# Patient Record
Sex: MALE | Race: WHITE | NOT HISPANIC OR LATINO | Employment: UNEMPLOYED | URBAN - METROPOLITAN AREA
[De-identification: names, ages, dates, MRNs, and addresses within clinical notes are randomized per-mention and may not be internally consistent; named-entity substitution may affect disease eponyms.]

---

## 2018-10-16 ENCOUNTER — TRANSCRIBE ORDERS (OUTPATIENT)
Dept: ADMINISTRATIVE | Facility: HOSPITAL | Age: 38
End: 2018-10-16

## 2018-10-16 DIAGNOSIS — I86.1 RIGHT VARICOCELE: ICD-10-CM

## 2018-10-16 DIAGNOSIS — N50.811 TESTICULAR PAIN, RIGHT: Primary | ICD-10-CM

## 2018-10-19 ENCOUNTER — HOSPITAL ENCOUNTER (OUTPATIENT)
Dept: CT IMAGING | Facility: HOSPITAL | Age: 38
Discharge: HOME | End: 2018-10-19
Admitting: NURSE PRACTITIONER

## 2018-10-19 DIAGNOSIS — I86.1 RIGHT VARICOCELE: ICD-10-CM

## 2018-10-19 DIAGNOSIS — N50.811 TESTICULAR PAIN, RIGHT: ICD-10-CM

## 2018-10-19 PROCEDURE — 74178 CT ABD&PLV WO CNTR FLWD CNTR: CPT

## 2018-10-19 PROCEDURE — 25010000002 IOPAMIDOL 61 % SOLUTION: Performed by: NURSE PRACTITIONER

## 2018-10-19 RX ADMIN — IOPAMIDOL 90 ML: 612 INJECTION, SOLUTION INTRAVENOUS at 09:50

## 2018-10-19 RX ADMIN — BARIUM SULFATE 450 ML: 21 SUSPENSION ORAL at 09:50

## 2024-03-26 ENCOUNTER — HOSPITAL ENCOUNTER (INPATIENT)
Facility: HOSPITAL | Age: 44
LOS: 1 days | Discharge: HOME | DRG: 309 | End: 2024-03-29
Attending: EMERGENCY MEDICINE | Admitting: INTERNAL MEDICINE
Payer: COMMERCIAL

## 2024-03-26 ENCOUNTER — APPOINTMENT (OUTPATIENT)
Dept: GENERAL RADIOLOGY | Facility: HOSPITAL | Age: 44
DRG: 309 | End: 2024-03-26
Payer: COMMERCIAL

## 2024-03-26 DIAGNOSIS — I48.91 ATRIAL FIBRILLATION WITH RAPID VENTRICULAR RESPONSE: Primary | ICD-10-CM

## 2024-03-26 LAB
ALBUMIN SERPL-MCNC: 4.7 G/DL (ref 3.5–5.2)
ALBUMIN/GLOB SERPL: 1.4 G/DL
ALP SERPL-CCNC: 79 U/L (ref 39–117)
ALT SERPL W P-5'-P-CCNC: 55 U/L (ref 1–41)
ANION GAP SERPL CALCULATED.3IONS-SCNC: 11 MMOL/L (ref 5–15)
AST SERPL-CCNC: 51 U/L (ref 1–40)
BASOPHILS # BLD AUTO: 0.08 10*3/MM3 (ref 0–0.2)
BASOPHILS NFR BLD AUTO: 0.7 % (ref 0–1.5)
BILIRUB SERPL-MCNC: 1.1 MG/DL (ref 0–1.2)
BUN SERPL-MCNC: 13 MG/DL (ref 6–20)
BUN/CREAT SERPL: 10.3 (ref 7–25)
CALCIUM SPEC-SCNC: 10.2 MG/DL (ref 8.6–10.5)
CHLORIDE SERPL-SCNC: 96 MMOL/L (ref 98–107)
CO2 SERPL-SCNC: 26 MMOL/L (ref 22–29)
CREAT SERPL-MCNC: 1.26 MG/DL (ref 0.76–1.27)
DEPRECATED RDW RBC AUTO: 42.5 FL (ref 37–54)
EGFRCR SERPLBLD CKD-EPI 2021: 72.1 ML/MIN/1.73
EOSINOPHIL # BLD AUTO: 0.08 10*3/MM3 (ref 0–0.4)
EOSINOPHIL NFR BLD AUTO: 0.7 % (ref 0.3–6.2)
ERYTHROCYTE [DISTWIDTH] IN BLOOD BY AUTOMATED COUNT: 12.9 % (ref 12.3–15.4)
GLOBULIN UR ELPH-MCNC: 3.3 GM/DL
GLUCOSE SERPL-MCNC: 108 MG/DL (ref 65–99)
HCT VFR BLD AUTO: 49.4 % (ref 37.5–51)
HGB BLD-MCNC: 16.8 G/DL (ref 13–17.7)
HOLD SPECIMEN: NORMAL
HOLD SPECIMEN: NORMAL
IMM GRANULOCYTES # BLD AUTO: 0.03 10*3/MM3 (ref 0–0.05)
IMM GRANULOCYTES NFR BLD AUTO: 0.3 % (ref 0–0.5)
LYMPHOCYTES # BLD AUTO: 1.91 10*3/MM3 (ref 0.7–3.1)
LYMPHOCYTES NFR BLD AUTO: 17.1 % (ref 19.6–45.3)
MAGNESIUM SERPL-MCNC: 2.2 MG/DL (ref 1.6–2.6)
MCH RBC QN AUTO: 30.3 PG (ref 26.6–33)
MCHC RBC AUTO-ENTMCNC: 34 G/DL (ref 31.5–35.7)
MCV RBC AUTO: 89 FL (ref 79–97)
MONOCYTES # BLD AUTO: 0.86 10*3/MM3 (ref 0.1–0.9)
MONOCYTES NFR BLD AUTO: 7.7 % (ref 5–12)
NEUTROPHILS NFR BLD AUTO: 73.5 % (ref 42.7–76)
NEUTROPHILS NFR BLD AUTO: 8.18 10*3/MM3 (ref 1.7–7)
NRBC BLD AUTO-RTO: 0 /100 WBC (ref 0–0.2)
PLATELET # BLD AUTO: 335 10*3/MM3 (ref 140–450)
PMV BLD AUTO: 10.1 FL (ref 6–12)
POTASSIUM SERPL-SCNC: 4.7 MMOL/L (ref 3.5–5.2)
PROT SERPL-MCNC: 8 G/DL (ref 6–8.5)
QT INTERVAL: 274 MS
QTC INTERVAL: 459 MS
RBC # BLD AUTO: 5.55 10*6/MM3 (ref 4.14–5.8)
SODIUM SERPL-SCNC: 133 MMOL/L (ref 136–145)
WBC NRBC COR # BLD AUTO: 11.14 10*3/MM3 (ref 3.4–10.8)
WHOLE BLOOD HOLD COAG: NORMAL
WHOLE BLOOD HOLD SPECIMEN: NORMAL

## 2024-03-26 PROCEDURE — 25810000003 SODIUM CHLORIDE 0.9 % SOLUTION: Performed by: EMERGENCY MEDICINE

## 2024-03-26 PROCEDURE — 93005 ELECTROCARDIOGRAM TRACING: CPT | Performed by: EMERGENCY MEDICINE

## 2024-03-26 PROCEDURE — 71045 X-RAY EXAM CHEST 1 VIEW: CPT

## 2024-03-26 PROCEDURE — 85025 COMPLETE CBC W/AUTO DIFF WBC: CPT | Performed by: EMERGENCY MEDICINE

## 2024-03-26 PROCEDURE — 99291 CRITICAL CARE FIRST HOUR: CPT

## 2024-03-26 PROCEDURE — 83735 ASSAY OF MAGNESIUM: CPT | Performed by: EMERGENCY MEDICINE

## 2024-03-26 PROCEDURE — 93005 ELECTROCARDIOGRAM TRACING: CPT

## 2024-03-26 PROCEDURE — 80053 COMPREHEN METABOLIC PANEL: CPT | Performed by: EMERGENCY MEDICINE

## 2024-03-26 RX ORDER — SODIUM CHLORIDE 0.9 % (FLUSH) 0.9 %
10 SYRINGE (ML) INJECTION AS NEEDED
Status: DISCONTINUED | OUTPATIENT
Start: 2024-03-26 | End: 2024-03-27

## 2024-03-26 RX ORDER — DILTIAZEM HYDROCHLORIDE 5 MG/ML
20 INJECTION INTRAVENOUS ONCE
Status: COMPLETED | OUTPATIENT
Start: 2024-03-26 | End: 2024-03-26

## 2024-03-26 RX ADMIN — DILTIAZEM HYDROCHLORIDE 20 MG: 5 INJECTION INTRAVENOUS at 23:00

## 2024-03-26 RX ADMIN — SODIUM CHLORIDE 1000 ML: 9 INJECTION, SOLUTION INTRAVENOUS at 23:00

## 2024-03-26 NOTE — Clinical Note
Level of Care: Telemetry [5]   Diagnosis: A-fib [[id number]]   Admitting Physician: RODO FIELDS [[id number]]   Attending Physician: RODO FIELDS [[id number]]

## 2024-03-27 ENCOUNTER — APPOINTMENT (OUTPATIENT)
Dept: CARDIOLOGY | Facility: HOSPITAL | Age: 44
DRG: 309 | End: 2024-03-27
Payer: COMMERCIAL

## 2024-03-27 PROBLEM — D72.829 LEUKOCYTOSIS: Status: ACTIVE | Noted: 2024-03-27

## 2024-03-27 PROBLEM — I48.91 ATRIAL FIBRILLATION WITH RVR: Status: ACTIVE | Noted: 2024-03-27

## 2024-03-27 PROBLEM — F41.8 SITUATIONAL ANXIETY: Status: ACTIVE | Noted: 2024-03-27

## 2024-03-27 PROBLEM — I10 HTN (HYPERTENSION): Status: ACTIVE | Noted: 2024-03-27

## 2024-03-27 PROBLEM — I48.0 PAF (PAROXYSMAL ATRIAL FIBRILLATION): Status: ACTIVE | Noted: 2024-03-27

## 2024-03-27 PROBLEM — I48.91 A-FIB: Status: ACTIVE | Noted: 2024-03-27

## 2024-03-27 LAB
ANION GAP SERPL CALCULATED.3IONS-SCNC: 9 MMOL/L (ref 5–15)
BASOPHILS # BLD AUTO: 0.06 10*3/MM3 (ref 0–0.2)
BASOPHILS NFR BLD AUTO: 0.5 % (ref 0–1.5)
BH CV ECHO MEAS - AO MAX PG: 6.5 MMHG
BH CV ECHO MEAS - AO MEAN PG: 3 MMHG
BH CV ECHO MEAS - AO ROOT DIAM: 3.6 CM
BH CV ECHO MEAS - AO V2 MAX: 127 CM/SEC
BH CV ECHO MEAS - AO V2 VTI: 27.1 CM
BH CV ECHO MEAS - AVA(I,D): 2.24 CM2
BH CV ECHO MEAS - EDV(CUBED): 79.5 ML
BH CV ECHO MEAS - EDV(MOD-SP2): 114 ML
BH CV ECHO MEAS - EDV(MOD-SP4): 81.9 ML
BH CV ECHO MEAS - EF(MOD-BP): 61.8 %
BH CV ECHO MEAS - EF(MOD-SP2): 66.1 %
BH CV ECHO MEAS - EF(MOD-SP4): 58.9 %
BH CV ECHO MEAS - ESV(CUBED): 22 ML
BH CV ECHO MEAS - ESV(MOD-SP2): 38.7 ML
BH CV ECHO MEAS - ESV(MOD-SP4): 33.7 ML
BH CV ECHO MEAS - FS: 34.9 %
BH CV ECHO MEAS - IVS/LVPW: 1.17 CM
BH CV ECHO MEAS - IVSD: 1.4 CM
BH CV ECHO MEAS - LA DIMENSION: 3.9 CM
BH CV ECHO MEAS - LAT PEAK E' VEL: 11.9 CM/SEC
BH CV ECHO MEAS - LV MASS(C)D: 207.8 GRAMS
BH CV ECHO MEAS - LV MAX PG: 4.2 MMHG
BH CV ECHO MEAS - LV MEAN PG: 2 MMHG
BH CV ECHO MEAS - LV V1 MAX: 103 CM/SEC
BH CV ECHO MEAS - LV V1 VTI: 19.3 CM
BH CV ECHO MEAS - LVIDD: 4.3 CM
BH CV ECHO MEAS - LVIDS: 2.8 CM
BH CV ECHO MEAS - LVOT AREA: 3.1 CM2
BH CV ECHO MEAS - LVOT DIAM: 2 CM
BH CV ECHO MEAS - LVPWD: 1.2 CM
BH CV ECHO MEAS - MED PEAK E' VEL: 15.7 CM/SEC
BH CV ECHO MEAS - MV DEC SLOPE: 527.5 CM/SEC2
BH CV ECHO MEAS - MV DEC TIME: 0.21 SEC
BH CV ECHO MEAS - MV E MAX VEL: 109.5 CM/SEC
BH CV ECHO MEAS - MV MAX PG: 6 MMHG
BH CV ECHO MEAS - MV MEAN PG: 2 MMHG
BH CV ECHO MEAS - MV V2 VTI: 17.5 CM
BH CV ECHO MEAS - MVA(VTI): 3.5 CM2
BH CV ECHO MEAS - PA ACC TIME: 0.12 SEC
BH CV ECHO MEAS - PA V2 MAX: 106.5 CM/SEC
BH CV ECHO MEAS - SV(LVOT): 60.6 ML
BH CV ECHO MEAS - SV(MOD-SP2): 75.3 ML
BH CV ECHO MEAS - SV(MOD-SP4): 48.2 ML
BH CV ECHO MEASUREMENTS AVERAGE E/E' RATIO: 7.93
BH CV VAS BP RIGHT ARM: NORMAL MMHG
BH CV XLRA - RV BASE: 3.2 CM
BH CV XLRA - RV LENGTH: 6.5 CM
BH CV XLRA - RV MID: 2.7 CM
BH CV XLRA - TDI S': 16.7 CM/SEC
BILIRUB UR QL STRIP: NEGATIVE
BUN SERPL-MCNC: 12 MG/DL (ref 6–20)
BUN/CREAT SERPL: 9.5 (ref 7–25)
CALCIUM SPEC-SCNC: 10 MG/DL (ref 8.6–10.5)
CHLORIDE SERPL-SCNC: 101 MMOL/L (ref 98–107)
CHOLEST SERPL-MCNC: 265 MG/DL (ref 0–200)
CLARITY UR: CLEAR
CO2 SERPL-SCNC: 26 MMOL/L (ref 22–29)
COLOR UR: YELLOW
CREAT SERPL-MCNC: 1.26 MG/DL (ref 0.76–1.27)
D-LACTATE SERPL-SCNC: 1.7 MMOL/L (ref 0.5–2)
D-LACTATE SERPL-SCNC: 2.1 MMOL/L (ref 0.5–2)
DEPRECATED RDW RBC AUTO: 43.7 FL (ref 37–54)
EGFRCR SERPLBLD CKD-EPI 2021: 72.1 ML/MIN/1.73
EOSINOPHIL # BLD AUTO: 0.1 10*3/MM3 (ref 0–0.4)
EOSINOPHIL NFR BLD AUTO: 0.9 % (ref 0.3–6.2)
ERYTHROCYTE [DISTWIDTH] IN BLOOD BY AUTOMATED COUNT: 13.2 % (ref 12.3–15.4)
GLUCOSE SERPL-MCNC: 115 MG/DL (ref 65–99)
GLUCOSE UR STRIP-MCNC: NEGATIVE MG/DL
HBA1C MFR BLD: 5.2 % (ref 4.8–5.6)
HCT VFR BLD AUTO: 47.5 % (ref 37.5–51)
HDLC SERPL-MCNC: 29 MG/DL (ref 40–60)
HGB BLD-MCNC: 16.2 G/DL (ref 13–17.7)
HGB UR QL STRIP.AUTO: NEGATIVE
HOLD SPECIMEN: NORMAL
IMM GRANULOCYTES # BLD AUTO: 0.03 10*3/MM3 (ref 0–0.05)
IMM GRANULOCYTES NFR BLD AUTO: 0.3 % (ref 0–0.5)
KETONES UR QL STRIP: NEGATIVE
LDLC SERPL CALC-MCNC: 178 MG/DL (ref 0–100)
LDLC/HDLC SERPL: 6.08 {RATIO}
LEFT ATRIUM VOLUME INDEX: 24.5 ML/M2
LEUKOCYTE ESTERASE UR QL STRIP.AUTO: NEGATIVE
LYMPHOCYTES # BLD AUTO: 1.78 10*3/MM3 (ref 0.7–3.1)
LYMPHOCYTES NFR BLD AUTO: 15.4 % (ref 19.6–45.3)
MCH RBC QN AUTO: 30.9 PG (ref 26.6–33)
MCHC RBC AUTO-ENTMCNC: 34.1 G/DL (ref 31.5–35.7)
MCV RBC AUTO: 90.6 FL (ref 79–97)
MONOCYTES # BLD AUTO: 0.8 10*3/MM3 (ref 0.1–0.9)
MONOCYTES NFR BLD AUTO: 6.9 % (ref 5–12)
NEUTROPHILS NFR BLD AUTO: 76 % (ref 42.7–76)
NEUTROPHILS NFR BLD AUTO: 8.81 10*3/MM3 (ref 1.7–7)
NITRITE UR QL STRIP: NEGATIVE
NRBC BLD AUTO-RTO: 0 /100 WBC (ref 0–0.2)
NT-PROBNP SERPL-MCNC: 589.5 PG/ML (ref 0–450)
PH UR STRIP.AUTO: 6.5 [PH] (ref 5–8)
PLATELET # BLD AUTO: 318 10*3/MM3 (ref 140–450)
PMV BLD AUTO: 10 FL (ref 6–12)
POTASSIUM SERPL-SCNC: 4.6 MMOL/L (ref 3.5–5.2)
PROCALCITONIN SERPL-MCNC: 0.04 NG/ML (ref 0–0.25)
PROT UR QL STRIP: NEGATIVE
RBC # BLD AUTO: 5.24 10*6/MM3 (ref 4.14–5.8)
SODIUM SERPL-SCNC: 136 MMOL/L (ref 136–145)
SP GR UR STRIP: 1.01 (ref 1–1.03)
TRIGL SERPL-MCNC: 298 MG/DL (ref 0–150)
TROPONIN T SERPL HS-MCNC: 36 NG/L
TSH SERPL DL<=0.05 MIU/L-ACNC: 1.95 UIU/ML (ref 0.27–4.2)
UROBILINOGEN UR QL STRIP: NORMAL
VLDLC SERPL-MCNC: 58 MG/DL (ref 5–40)
WBC NRBC COR # BLD AUTO: 11.58 10*3/MM3 (ref 3.4–10.8)

## 2024-03-27 PROCEDURE — 81003 URINALYSIS AUTO W/O SCOPE: CPT | Performed by: NURSE PRACTITIONER

## 2024-03-27 PROCEDURE — 25810000003 SODIUM CHLORIDE 0.9 % SOLUTION: Performed by: NURSE PRACTITIONER

## 2024-03-27 PROCEDURE — G0378 HOSPITAL OBSERVATION PER HR: HCPCS

## 2024-03-27 PROCEDURE — 83605 ASSAY OF LACTIC ACID: CPT | Performed by: NURSE PRACTITIONER

## 2024-03-27 PROCEDURE — 36415 COLL VENOUS BLD VENIPUNCTURE: CPT

## 2024-03-27 PROCEDURE — 80061 LIPID PANEL: CPT | Performed by: NURSE PRACTITIONER

## 2024-03-27 PROCEDURE — 84443 ASSAY THYROID STIM HORMONE: CPT | Performed by: EMERGENCY MEDICINE

## 2024-03-27 PROCEDURE — 84484 ASSAY OF TROPONIN QUANT: CPT | Performed by: EMERGENCY MEDICINE

## 2024-03-27 PROCEDURE — 83880 ASSAY OF NATRIURETIC PEPTIDE: CPT | Performed by: EMERGENCY MEDICINE

## 2024-03-27 PROCEDURE — 93005 ELECTROCARDIOGRAM TRACING: CPT | Performed by: PHYSICIAN ASSISTANT

## 2024-03-27 PROCEDURE — 83036 HEMOGLOBIN GLYCOSYLATED A1C: CPT | Performed by: NURSE PRACTITIONER

## 2024-03-27 PROCEDURE — 80048 BASIC METABOLIC PNL TOTAL CA: CPT | Performed by: NURSE PRACTITIONER

## 2024-03-27 PROCEDURE — 25010000002 AMIODARONE IN DEXTROSE 5% 360-4.14 MG/200ML-% SOLUTION: Performed by: PHYSICIAN ASSISTANT

## 2024-03-27 PROCEDURE — 25810000003 SODIUM CHLORIDE 0.9 % SOLUTION: Performed by: INTERNAL MEDICINE

## 2024-03-27 PROCEDURE — 84145 PROCALCITONIN (PCT): CPT | Performed by: NURSE PRACTITIONER

## 2024-03-27 PROCEDURE — 25010000002 AMIODARONE IN DEXTROSE 5% 150-4.21 MG/100ML-% SOLUTION: Performed by: PHYSICIAN ASSISTANT

## 2024-03-27 PROCEDURE — 25010000002 ENOXAPARIN PER 10 MG: Performed by: NURSE PRACTITIONER

## 2024-03-27 PROCEDURE — 99232 SBSQ HOSP IP/OBS MODERATE 35: CPT | Performed by: INTERNAL MEDICINE

## 2024-03-27 PROCEDURE — 99223 1ST HOSP IP/OBS HIGH 75: CPT | Performed by: INTERNAL MEDICINE

## 2024-03-27 PROCEDURE — 85025 COMPLETE CBC W/AUTO DIFF WBC: CPT | Performed by: NURSE PRACTITIONER

## 2024-03-27 PROCEDURE — 93306 TTE W/DOPPLER COMPLETE: CPT

## 2024-03-27 RX ORDER — ACETAMINOPHEN 160 MG/5ML
650 SOLUTION ORAL EVERY 4 HOURS PRN
Status: DISCONTINUED | OUTPATIENT
Start: 2024-03-27 | End: 2024-03-29 | Stop reason: HOSPADM

## 2024-03-27 RX ORDER — SODIUM CHLORIDE 9 MG/ML
75 INJECTION, SOLUTION INTRAVENOUS CONTINUOUS
Status: ACTIVE | OUTPATIENT
Start: 2024-03-27 | End: 2024-03-27

## 2024-03-27 RX ORDER — ZOLPIDEM TARTRATE 5 MG/1
5 TABLET ORAL NIGHTLY PRN
Status: DISCONTINUED | OUTPATIENT
Start: 2024-03-27 | End: 2024-03-29 | Stop reason: HOSPADM

## 2024-03-27 RX ORDER — AMOXICILLIN 250 MG
2 CAPSULE ORAL 2 TIMES DAILY PRN
Status: DISCONTINUED | OUTPATIENT
Start: 2024-03-27 | End: 2024-03-29 | Stop reason: HOSPADM

## 2024-03-27 RX ORDER — ACETAMINOPHEN 650 MG/1
650 SUPPOSITORY RECTAL EVERY 4 HOURS PRN
Status: DISCONTINUED | OUTPATIENT
Start: 2024-03-27 | End: 2024-03-29 | Stop reason: HOSPADM

## 2024-03-27 RX ORDER — CETIRIZINE HYDROCHLORIDE 10 MG/1
10 TABLET ORAL DAILY
COMMUNITY

## 2024-03-27 RX ORDER — SODIUM CHLORIDE 9 MG/ML
40 INJECTION, SOLUTION INTRAVENOUS AS NEEDED
Status: DISCONTINUED | OUTPATIENT
Start: 2024-03-27 | End: 2024-03-29 | Stop reason: HOSPADM

## 2024-03-27 RX ORDER — SODIUM CHLORIDE 0.9 % (FLUSH) 0.9 %
10 SYRINGE (ML) INJECTION AS NEEDED
Status: DISCONTINUED | OUTPATIENT
Start: 2024-03-27 | End: 2024-03-29 | Stop reason: HOSPADM

## 2024-03-27 RX ORDER — ONDANSETRON 4 MG/1
4 TABLET, ORALLY DISINTEGRATING ORAL EVERY 6 HOURS PRN
Status: DISCONTINUED | OUTPATIENT
Start: 2024-03-27 | End: 2024-03-29 | Stop reason: HOSPADM

## 2024-03-27 RX ORDER — SODIUM CHLORIDE 0.9 % (FLUSH) 0.9 %
10 SYRINGE (ML) INJECTION EVERY 12 HOURS SCHEDULED
Status: DISCONTINUED | OUTPATIENT
Start: 2024-03-27 | End: 2024-03-29 | Stop reason: HOSPADM

## 2024-03-27 RX ORDER — CHOLECALCIFEROL (VITAMIN D3) 125 MCG
5 CAPSULE ORAL NIGHTLY PRN
Status: DISCONTINUED | OUTPATIENT
Start: 2024-03-27 | End: 2024-03-29 | Stop reason: HOSPADM

## 2024-03-27 RX ORDER — HYDROXYZINE HYDROCHLORIDE 25 MG/1
25 TABLET, FILM COATED ORAL 3 TIMES DAILY PRN
Status: DISCONTINUED | OUTPATIENT
Start: 2024-03-27 | End: 2024-03-29 | Stop reason: HOSPADM

## 2024-03-27 RX ORDER — ENOXAPARIN SODIUM 100 MG/ML
40 INJECTION SUBCUTANEOUS EVERY 12 HOURS SCHEDULED
Status: DISCONTINUED | OUTPATIENT
Start: 2024-03-27 | End: 2024-03-27

## 2024-03-27 RX ORDER — ACETAMINOPHEN 325 MG/1
650 TABLET ORAL EVERY 4 HOURS PRN
Status: DISCONTINUED | OUTPATIENT
Start: 2024-03-27 | End: 2024-03-29 | Stop reason: HOSPADM

## 2024-03-27 RX ORDER — POLYETHYLENE GLYCOL 3350 17 G/17G
17 POWDER, FOR SOLUTION ORAL DAILY PRN
Status: DISCONTINUED | OUTPATIENT
Start: 2024-03-27 | End: 2024-03-29 | Stop reason: HOSPADM

## 2024-03-27 RX ORDER — ONDANSETRON 2 MG/ML
4 INJECTION INTRAMUSCULAR; INTRAVENOUS EVERY 6 HOURS PRN
Status: DISCONTINUED | OUTPATIENT
Start: 2024-03-27 | End: 2024-03-29 | Stop reason: HOSPADM

## 2024-03-27 RX ORDER — BISACODYL 10 MG
10 SUPPOSITORY, RECTAL RECTAL DAILY PRN
Status: DISCONTINUED | OUTPATIENT
Start: 2024-03-27 | End: 2024-03-29 | Stop reason: HOSPADM

## 2024-03-27 RX ORDER — DILTIAZEM HCL/D5W 125 MG/125
5-15 PLASTIC BAG, INJECTION (ML) INTRAVENOUS
Status: DISCONTINUED | OUTPATIENT
Start: 2024-03-27 | End: 2024-03-27

## 2024-03-27 RX ORDER — BISACODYL 5 MG/1
5 TABLET, DELAYED RELEASE ORAL DAILY PRN
Status: DISCONTINUED | OUTPATIENT
Start: 2024-03-27 | End: 2024-03-29 | Stop reason: HOSPADM

## 2024-03-27 RX ORDER — LOSARTAN POTASSIUM 50 MG/1
75 TABLET ORAL DAILY
COMMUNITY
End: 2024-03-29 | Stop reason: HOSPADM

## 2024-03-27 RX ADMIN — SODIUM CHLORIDE 75 ML/HR: 9 INJECTION, SOLUTION INTRAVENOUS at 07:01

## 2024-03-27 RX ADMIN — METOPROLOL TARTRATE 25 MG: 25 TABLET, FILM COATED ORAL at 20:15

## 2024-03-27 RX ADMIN — Medication 5 MG/HR: at 00:55

## 2024-03-27 RX ADMIN — AMIODARONE HYDROCHLORIDE 150 MG: 1.5 INJECTION, SOLUTION INTRAVENOUS at 10:09

## 2024-03-27 RX ADMIN — ENOXAPARIN SODIUM 40 MG: 100 INJECTION SUBCUTANEOUS at 08:06

## 2024-03-27 RX ADMIN — RIVAROXABAN 20 MG: 20 TABLET, FILM COATED ORAL at 18:27

## 2024-03-27 RX ADMIN — Medication 15 MG/HR: at 08:17

## 2024-03-27 RX ADMIN — AMIODARONE HYDROCHLORIDE 1 MG/MIN: 1.8 INJECTION, SOLUTION INTRAVENOUS at 16:19

## 2024-03-27 RX ADMIN — METOPROLOL TARTRATE 25 MG: 25 TABLET, FILM COATED ORAL at 09:03

## 2024-03-27 RX ADMIN — AMIODARONE HYDROCHLORIDE 1 MG/MIN: 1.8 INJECTION, SOLUTION INTRAVENOUS at 21:36

## 2024-03-27 RX ADMIN — SODIUM CHLORIDE 1000 ML: 9 INJECTION, SOLUTION INTRAVENOUS at 03:20

## 2024-03-27 RX ADMIN — AMIODARONE HYDROCHLORIDE 1 MG/MIN: 1.8 INJECTION, SOLUTION INTRAVENOUS at 10:09

## 2024-03-27 NOTE — CONSULTS
Cardiology Consult - [facility]    Neo Stern     S213/1  1980  [address] [place] [address] [place]  [facility] [id number]         Admission Date:  3/26/2024    Consultation Date:  03/27/24        PCP:  Lea Archer APRN  Referring MD:  Dr. Godfrey  Consulting MD:  Dr. Oneill  Primary Cardiologist:  Dr. Bob        CC: Lightheadedness, tachy palps    Reason for Consult: AFib with RVR        Allergies:  is allergic to doxycycline and erythromycin.    Medications Prior to Admission   Medication Sig Dispense Refill Last Dose    cetirizine (zyrTEC) 10 MG tablet Take 1 tablet by mouth Daily.   3/26/2024    losartan (COZAAR) 50 MG tablet Take 1.5 tablets by mouth Daily.          dilTIAZem, 5-15 mg/hr, Last Rate: 15 mg/hr (03/27/24 0154)  sodium chloride, 75 mL/hr, Last Rate: 75 mL/hr (03/27/24 0701)          HPI:  Neo Stern is a 44 yo male with PMHx HTN, PAF, seasonal allergies, anxiety.  Recently underwent sleep study and was diagnosed with sleep apnea - he has a follow up with them.  He was diagnosed with AFib in 2008 and at one time put on a beta blocker and coumadin.  He has since discontinued those meds.  Had another episode AFib in 2018, was placed on oral Cardizem short term and saw Dr. Bob in the office for further evaluation but did not keep follow up.    He presents to [facility] ED after having a pre syncopal episode and fluttering after receiving 2 shots of lidocaine for a skin tag removal.  Heart rates did not normalize after several hours prompting him to seek medical attention.  Upon arrival to ED, found to be in AFib with HR 160s. He was started on Cardizem gtt, admitted to hospitalist services and cardiology has been asked to follow in consultation.    At time of consultation, he remains in AFib rates 120-160s.  His wife is presents at bedside.  This is the first episode of AFib since 2018.  He does feel fluttering on occasion but these are very fleeting.  Again, he reiterates  "that he just underwent sleep study and has follow up to be fitted for CPAP.  He denies excessive caffeine intake, denies illicit drug use.  He is very active at work and works long hours.  He admits to being very stressed and anxious and when his heart races/skips it makes him more anxious.  He has no family history of precocious CAD or arrhythmia.          Social History     Socioeconomic History    Marital status:    Tobacco Use    Smoking status: Never    Smokeless tobacco: Never   Substance and Sexual Activity    Alcohol use: No    Drug use: No    Sexual activity: Defer     Family History   Problem Relation Age of Onset    No Known Problems Mother     Cancer Father     Hypertension Father      Past Surgical History:   Procedure Laterality Date    NO PAST SURGERIES       ROS: Pertinent items are noted in HPI, all other systems reviewed and negative     Objective     height is 185.4 cm (73\") and weight is 143 kg (315 lb) (abnormal). His oral temperature is 97.8 °F (36.6 °C). His blood pressure is 156/94 and his pulse is 99. His respiration is 20 and oxygen saturation is 96%.    Intake/Output Summary (Last 24 hours) at 3/27/2024 0806  Last data filed at 3/27/2024 0600  Gross per 24 hour   Intake 1000 ml   Output 525 ml   Net 475 ml     Intake/Output                   03/26/24 0701 - 03/27/24 0700     [id number] Total              Intake    IV Piggyback  --  1000 1000    Total Intake -- 1000 1000       Output    Urine  --  525 525    Total Output -- 525 525               03/26/24 2227   Weight: (!) 143 kg (315 lb)          Physical Exam:  General Appearance:    Alert, cooperative, in no acute distress   Head:    Normocephalic, without obvious abnormality, atraumatic   Eyes:            Lids and lashes normal, conjunctivae and sclerae normal, no   icterus, no pallor, corneas clear, PERRLA   Ears:    Ears appear intact with no abnormalities noted   Throat:   No oral lesions, no thrush, oral mucosa " moist   Neck:   No adenopathy, supple, trachea midline, no thyromegaly, no carotid bruit, no JVD   Back:     No kyphosis present, no scoliosis present, no skin lesions,    erythema or scars, no tenderness to percussion or                   palpation, range of motion normal   Lungs:     Clear to auscultation,respirations regular, even and               unlabored    Heart:    Irregular rhythm and tachycardic rate, normal S1 and S2, no         murmur, no gallop, no rub, no click   Abdomen:     Normal bowel sounds, no masses, no organomegaly, soft     nontender, nondistended, no guarding, no rebound   tenderness   Extremities:   Moves all extremities well,  no cyanosis, no redness, no edema   Pulses:   Pulses palpable and equal bilaterally   Skin:   No bleeding, bruising or rash   Lymph nodes:   No palpable adenopathy   Neurologic:   Cranial nerves 2 - 12 grossly intact, sensation intact, DTR     present and equal bilaterally          Results Review:  I personally reviewed the patient's clinical results.  Results from last 7 days   Lab Units 03/26/24  2239   WBC 10*3/mm3 11.14*   HEMOGLOBIN g/dL 16.8   HEMATOCRIT % 49.4   PLATELETS 10*3/mm3 335     Results from last 7 days   Lab Units 03/26/24  2239   SODIUM mmol/L 133*   POTASSIUM mmol/L 4.7   CHLORIDE mmol/L 96*   CO2 mmol/L 26.0   BUN mg/dL 13   CREATININE mg/dL 1.26   CALCIUM mg/dL 10.2   BILIRUBIN mg/dL 1.1   ALK PHOS U/L 79   ALT (SGPT) U/L 55*   AST (SGOT) U/L 51*   GLUCOSE mg/dL 108*           Results from last 7 days   Lab Units 03/27/24  0254   TSH uIU/mL 1.950         Results from last 7 days   Lab Units 03/27/24  0254   PROBNP pg/mL 589.5*             Radiology:  Imaging Results (Last 72 Hours)       Procedure Component Value Units Date/Time    XR Chest 1 View [[id number]] Collected: 03/26/24 2256     Updated: 03/26/24 2300    Narrative:      XR CHEST 1 VW    Date of Exam: 3/26/2024 10:41 PM EDT    Indication: Dysrhythmia triage protocol    Comparison:  2/25/2017    Findings:  Lines: None    Lungs: The lungs are clear.  Pleura: No pleural effusion or pneumothorax.    Cardiomediastinum: The cardiomediastinal silhouette is normal    Soft Tissues: Unremarkable.    Bones: No acute osseous abnormality.      Impression:      Impression:  No acute abnormality      Electronically Signed: Brendon Ramon DO    3/26/2024 10:57 PM EDT    Workstation ID: [id number]              Tele:  Atrial Fibrillation    ASSESSMENT:  -  42 yo male with PMHx PAF presents to [facility] ED with pre syncope and tachy palpitations after receiving 2 shots of Lidocaine for outpt procedure. Found to be in AFib with RVR upon presentation  -HTN  -HLP  -Obesity  -CRISTINO, new diagnosis.  Undergoing work up for CPAP.          PLAN:  -Admitted to hospitalist services.  Started on IV Cardizem per protocol.  -  Echo ordered, will review.  Allow patient to eat.  -  DC IV Cardizem, initiate amiodarone with bolus.  Continue beta blocker for now.  Consider Toprol XL at time of DC   -  DC Lovenox, start Xarelto 20mg daily with dinner.  CHADS2 Vasc = 1 (HTN).  -  Keep follow up with Pulm/Sleep Study to get evaluated for CPAP  -  Cardiology will continue to follow.  Hopefully home in a.m.            I discussed the patient's findings and my recommendations with the patient, any present family members, and the nursing staff.  Montrell Oneill MD saw and examined patient, verified hx and PE, read all radiographic studies, reviewed labs and micro data, and formulated dx, plan for treatment and all medical decision making.      Susan Meza PA-C, working with Montrell Oneill MD  03/27/24 08:06 EDT

## 2024-03-27 NOTE — PLAN OF CARE
Goal Outcome Evaluation:  Pt arrived to the floor at 0500.  Pt A&Ox4, Afib with uncontrolled rated on cardiziemat 15mls/hr.  RA. Q15min b/ps, on tele, pt denies any chest pain or pressure.  No s/s of distress.  Bed locked and low, SR up x2, call light in reach and alarm on.

## 2024-03-27 NOTE — ED NOTES
Neo Stern    Nursing Report ED to Floor:  Mental status: A&Ox4  Ambulatory status: Stand  by  Oxygen Therapy:  RA\  Cardiac Rhythm: A-fib w/ RVR  Admitted from: Home  Safety Concerns:  n/a  Social Issues: n/a  ED Room #:  11    ED Nurse Phone Extension - [phone] or may call [phone].      HPI:   Chief Complaint   Patient presents with    Atrial Fibrillation       Past Medical History:  Past Medical History:   Diagnosis Date    A-fib         Past Surgical History:  No past surgical history on file.     Admitting Doctor:   Jarvis Godfrey III, DO    Consulting Provider(s):  Consults       No orders found for last 30 day(s).             Admitting Diagnosis:   The encounter diagnosis was Atrial fibrillation with rapid ventricular response.    Most Recent Vitals:   Vitals:    03/27/24 0308 03/27/24 0309 03/27/24 0310 03/27/24 0311   BP:       Pulse: 107 108 101 116   Resp:       Temp:       TempSrc:       SpO2:  94% 94% 94%   Weight:       Height:           Active LDAs/IV Access:   Lines, Drains & Airways       Active LDAs       Name Placement date Placement time Site Days    Peripheral IV 03/26/24 2240 Left Antecubital 03/26/24 2240  Antecubital  less than 1                    Labs (abnormal labs have a star):   Labs Reviewed   COMPREHENSIVE METABOLIC PANEL - Abnormal; Notable for the following components:       Result Value    Glucose 108 (*)     Sodium 133 (*)     Chloride 96 (*)     ALT (SGPT) 55 (*)     AST (SGOT) 51 (*)     All other components within normal limits    Narrative:     GFR Normal >60  Chronic Kidney Disease <60  Kidney Failure <15     CBC WITH AUTO DIFFERENTIAL - Abnormal; Notable for the following components:    WBC 11.14 (*)     Lymphocyte % 17.1 (*)     Neutrophils, Absolute 8.18 (*)     All other components within normal limits   MAGNESIUM - Normal   RAINBOW DRAW    Narrative:     The following orders were created for panel order [place] Draw.  Procedure                                Abnormality         Status                     ---------                               -----------         ------                     Green Top (Gel)[[id number]]                                  Final result               Lavender Top[[id number]]                                     Final result               Gold Top - SST[[id number]]                                   Final result               Gray Top[[id number]]                                         Final result               Light Blue Top[[id number]]                                   Final result                 Please view results for these tests on the individual orders.   SINGLE HS TROPONIN T   TSH   BNP (IN-HOUSE)   CBC AND DIFFERENTIAL    Narrative:     The following orders were created for panel order CBC & Differential.  Procedure                               Abnormality         Status                     ---------                               -----------         ------                     CBC Auto Differential[[id number]]        Abnormal            Final result                 Please view results for these tests on the individual orders.   GREEN TOP   LAVENDER TOP   GOLD TOP - SST   GRAY TOP   LIGHT BLUE TOP       Meds Given in ED:   Medications   sodium chloride 0.9 % flush 10 mL (has no administration in time range)   dilTIAZem (CARDIZEM) 125 mg in 125 mL D5W infusion (15 mg/hr Intravenous Rate/Dose Change 3/27/24 0154)   sodium chloride 0.9 % bolus 1,000 mL (has no administration in time range)   dilTIAZem (CARDIZEM) injection 20 mg (20 mg Intravenous Given 3/26/24 2300)   sodium chloride 0.9 % bolus 1,000 mL (0 mL Intravenous Stopped 3/27/24 0027)     dilTIAZem, 5-15 mg/hr, Last Rate: 15 mg/hr (03/27/24 0154)         Last NIH score:                                                          Dysphagia screening results:  Patient Factors Component (Dysphagia:Stroke or Rule-out)  Best Eye Response: 4-->(E4) spontaneous (03/26/24 2305)  Best Motor Response:  6-->(M6) obeys commands (03/26/24 2305)  Best Verbal Response: 5-->(V5) oriented (03/26/24 2305)  [place] Coma Scale Score: 15 (03/26/24 2305)     [place] Coma Scale:  No data recorded     CIWA:        Restraint Type:            Isolation Status:  No active isolations

## 2024-03-27 NOTE — H&P
"    [facility] Medicine Services  HISTORY AND PHYSICAL    Patient Name: Neo Stern  : 1980  MRN: [id number]  Primary Care Physician: Lea Archer APRN  Date of admission: 3/26/2024    Subjective   Subjective     Chief Complaint:  Palpitations     HPI:  Neo Stern is a 43 y.o. male w/ a hx of HTN, HLD, paroxysmal atrial fibrillation who presented to the ED w/ c/o palpitations.   Pt first diagnosed w/ atrial fibrillation at 27 yrs ago. Pt reports that he was seen at an ED and given medication and converted to NSR. At that time he was started on a beta blocker and coumadin. He was referred to EP Cardiology (no intervention done at that time). Pt stopped the BB and coumadin some time after the episode. Pt did not experience another episode of A.Fib until 2018. He describes that he had been sick and developed palpitations and was evaluated at [facility] and found to be in A.Fib. Pt was given Cardizem. Pt was then referred to Dr. Bob w/ Cardiology. Pt reports that he was not started on medication and has not had a f/u in some time. Since 2018, pt reports that he will very randomly and infrequently have brief episodes of palpitations that resolve quickly w/ no associated symptoms.   Tuesday afternoon pt was seen at Dermatology for a skin tag removal. He describes that after he was given two shots of lidocaine he stood up and felt suddenly faint. He did not have LOC but felt as though he may pass out. Simultaneously he developed palpitations and \"fluttering\". Pt went home and continued to have a \"fluttering\" sensation. He checked his pulse and noted it to be ~109bpm prompting his ED visit.   Pt evaluated in the ED. EKG c/w A.Fib RVR (rate 160's). Troponin 36, proBNP 589. Pt admitted to the hospital medicine service for further evaluation.     Review of Systems   Constitutional: Negative.  Negative for chills, diaphoresis, fatigue, fever and unexpected weight change. "   HENT: Negative.  Negative for congestion, postnasal drip, rhinorrhea, sinus pressure, sinus pain, sneezing, sore throat and trouble swallowing.    Eyes: Negative.  Negative for visual disturbance.   Respiratory: Negative.  Negative for cough, shortness of breath and wheezing.    Cardiovascular:  Positive for palpitations. Negative for chest pain and leg swelling.   Gastrointestinal: Negative.  Negative for abdominal distention, abdominal pain, blood in stool, constipation, diarrhea, nausea and vomiting.   Endocrine: Negative.    Genitourinary: Negative.  Negative for decreased urine volume, difficulty urinating, dysuria, frequency and urgency.   Musculoskeletal: Negative.  Negative for arthralgias, back pain, myalgias, neck pain and neck stiffness.   Skin: Negative.  Negative for wound.   Allergic/Immunologic: Negative.  Negative for immunocompromised state.   Neurological: Negative.  Negative for dizziness, tremors, seizures, syncope, facial asymmetry, speech difficulty, weakness, light-headedness, numbness and headaches.        Near-syncope.    Hematological: Negative.  Does not bruise/bleed easily.   Psychiatric/Behavioral:  Negative for confusion. The patient is nervous/anxious.    All other systems reviewed and are negative.     Personal History     Past Medical History:   Diagnosis Date    A-fib     HLD (hyperlipidemia)     Hypertension      Past Surgical History:   Procedure Laterality Date    NO PAST SURGERIES       Family History: family history includes Cancer in his father; Hypertension in his father; No Known Problems in his mother.     Social History:  reports that he has never smoked. He has never used smokeless tobacco. He reports that he does not drink alcohol and does not use drugs.  Social History     Social History Narrative    Not on file     Medications:  amoxicillin    Allergies   Allergen Reactions    Doxycycline Other (See Comments)     Don't work on him     Erythromycin Other (See  Comments)     Happened as a child can not remember reaction  Stomach upset     Objective   Objective     Vital Signs:   Temp:  [97.9 °F (36.6 °C)] 97.9 °F (36.6 °C)  Heart Rate:  [] 130  Resp:  [18] 18  BP: ()/() 120/72    Physical Exam     Constitutional: Awake, alert; non-toxic appearing   Eyes: PERRLA, sclerae anicteric, no conjunctival injection  HENT: NCAT, mucous membranes moist  Neck: Supple, no thyromegaly, no lymphadenopathy, trachea midline  Respiratory: Clear to auscultation bilaterally, nonlabored respirations   Cardiovascular: Irregular rate and rhythm; no murmurs, rubs, or gallops, no peripheral edema   Gastrointestinal: Positive bowel sounds, soft, nontender, nondistended  Musculoskeletal: Normal ROM bilaterally   Psychiatric: Appropriate affect, cooperative  Neurologic: Oriented x 3, strength symmetric in all extremities, Cranial Nerves grossly intact to confrontation, speech clear  Skin: No rashes, lesions or wounds     Result Review:  I have personally reviewed the results from the time of this admission to 3/27/2024 03:59 EDT and agree with these findings:  [x]  Laboratory list / accordion  []  Microbiology  [x]  Radiology  [x]  EKG/Telemetry   []  Cardiology/Vascular   []  Pathology  [x]  Old records    LAB RESULTS:      Lab 03/26/24 2239   WBC 11.14*   HEMOGLOBIN 16.8   HEMATOCRIT 49.4   PLATELETS 335   NEUTROS ABS 8.18*   IMMATURE GRANS (ABS) 0.03   LYMPHS ABS 1.91   MONOS ABS 0.86   EOS ABS 0.08   MCV 89.0         Lab 03/27/24 0254 03/26/24 2239   SODIUM  --  133*   POTASSIUM  --  4.7   CHLORIDE  --  96*   CO2  --  26.0   ANION GAP  --  11.0   BUN  --  13   CREATININE  --  1.26   EGFR  --  72.1   GLUCOSE  --  108*   CALCIUM  --  10.2   MAGNESIUM  --  2.2   TSH 1.950  --          Lab 03/26/24 2239   TOTAL PROTEIN 8.0   ALBUMIN 4.7   GLOBULIN 3.3   ALT (SGPT) 55*   AST (SGOT) 51*   BILIRUBIN 1.1   ALK PHOS 79         Lab 03/27/24 0254   PROBNP 589.5*   HSTROP T 36*                  Brief Urine Lab Results       None          Microbiology Results (last 10 days)       ** No results found for the last 240 hours. **            XR Chest 1 View    Result Date: 3/26/2024  XR CHEST 1 VW Date of Exam: 3/26/2024 10:41 PM EDT Indication: Dysrhythmia triage protocol Comparison: 2/25/2017 Findings: Lines: None Lungs: The lungs are clear. Pleura: No pleural effusion or pneumothorax. Cardiomediastinum: The cardiomediastinal silhouette is normal Soft Tissues: Unremarkable. Bones: No acute osseous abnormality.     Impression: Impression: No acute abnormality Electronically Signed: Brendon Yenny, DO  3/26/2024 10:57 PM EDT  Workstation ID: [id number]       Assessment & Plan   Assessment & Plan       Atrial fibrillation with RVR    HTN (hypertension)    Leukocytosis    Situational anxiety    PAF (paroxysmal atrial fibrillation)    Neo Stern is a 43 y.o. male w/ a hx of HTN, HLD, paroxysmal atrial fibrillation who presented to the ED w/ c/o palpitations.     **Atrial fibrillation w/ RVR   **Hx of PAF   -first episode of A.Fib at 27 yrs old, given mediation in OSH ED and converted to NSR; deferred to EP Cardiology and started on coumadin and BB but stopped taking in months after episode   -second episode in 2018 (seen at [facility] ED), given Cardizem and referred to Dr. Lisbeth peoples initial rates in 160's   -EKG c/w A.Fib RVR; repeat this morning  -troponin 36; repeat/trend  -proBNP 589  -CXR unremarkable  -s/p 2 liters NS bolus given in ED  -continue NS @ 75ml/hour x 12 hours   -Cardizem infusion started in ED; continue per protocol   -tsh, FLP, hem A1c, coags- pending   -ECHO pending   -symptom mgt  -Cardiology consult this morning      **Leukocytosis   -very mild; WBC 11.14  -CXR unremarkable   -lactic, procal added to am labs   -UA Pending   -monitor     **HTN   **HLD   -pt takes Losartan routinely; awaiting med rec to verify dose   -pt reports that he was prescribed a statin  "~1 month ago but never started taking  -Lipid Panel pending     **Situational anxiety  -PRN Atarax     DVT prophylaxis:  Lovenox     CODE STATUS:    Code Status (Patient has no pulse and is not breathing): CPR (Attempt to Resuscitate)  Medical Interventions (Patient has pulse or is breathing): Full Support    Expected Discharge tbd    This note has been completed as part of a split-shared workflow.     Signature: Electronically signed by ROE Tinsley, 03/27/24, 3:59 AM EDT.    Time spent: 55 minutes       Attending   Admission Attestation       I have performed an independent face-to-face diagnostic evaluation including performing an independent physical examination.  I approve of the documented plan of care above that was reviewed and developed with the advanced practice clinician (APC) and take responsibility for that plan along with its associated risks.  I have updated the HPI as appropriate.    Brief HPI    43 M states that earlier today (Tuesday 3/26) the patient was at a dermatology appointment for skin tag removal, was given lidocaine injection to the area as part of a local anesthetic.  He states he eventually stood up and \"suddenly felt like I was going to pass out.\"  He states he did not lose consciousness but suffered a near syncopal episode.  He states soon after this he noticed \"the fluttering in my chest.\"  He states he has been diagnosed with atrial fibrillation in the past, specifically when he was 27 years old (16 years ago).  He states that after he was worked up for this including seeing an electrophysiologist, he never had any procedure and was not prescribed any medication, though he did take a beta-blocker and warfarin for some time; he states it was eventually stopped by another cardiologist.  He had another episode of atrial fibrillation in 2018, was given Cardizem at that time, but not discharged on anything.  He states that this is the first episode of sustained atrial " fibrillation he has had since then, though he does have occasional periods where he notices palpitations and tachycardia which resolve on their own.  During my visit he is still on diltiazem drip with heart rate ranging between 100-150.  He denies any chest pain or dyspnea.    Attending Physical Exam:  Temp:  [97.8 °F (36.6 °C)-97.9 °F (36.6 °C)] 97.8 °F (36.6 °C)  Heart Rate:  [] 138  Resp:  [18-20] 20  BP: ()/() 156/94    Constitutional: Awake, alert, NAD, very pleasant.  Eyes: PERRLA, sclerae anicteric, no conjunctival injection  HENT: NCAT, mucous membranes moist  Neck: Supple, no thyromegaly, no lymphadenopathy, trachea midline  Respiratory: Clear to auscultation bilaterally, nonlabored respirations   Cardiovascular: Tachycardic, irregularly irregular, no murmurs, rubs, or gallops, palpable pedal pulses bilaterally  Gastrointestinal: Positive bowel sounds, soft, nontender, nondistended  Musculoskeletal: No bilateral ankle edema, no clubbing or cyanosis to extremities  Psychiatric: Appropriate affect, cooperative  Neurologic: Oriented x 3, strength symmetric in all extremities, Cranial Nerves grossly intact to confrontation, speech clear  Skin: No rashes, normal turgor.    Result Review:  I have personally reviewed the results from the time of this admission to 3/27/2024 05:35 EDT and agree with these findings:  [x]  Laboratory list / accordion  []  Microbiology  [x]  Radiology  [x]  EKG/Telemetry   []  Cardiology/Vascular   []  Pathology  []  Old records  []  Other:  Most notable findings include: I reviewed chest x-ray which per my read shows no acute infiltrate/edema on this single AP view.  I reviewed EKG which by my read shows atrial fibrillation with RVR, ventricular rate approximately 170 bpm, left axis, nonspecific ST/T wave changes.    Assessment and Plan:    See assessment and plan documented by APC above and updated/edited by me as appropriate.      Total time spent: 41 minutes  Time  spent includes time reviewing chart, face-to-face time, counseling patient/family/caregiver, ordering medications/tests/procedures, communicating with other health care professionals, documenting clinical information in the electronic health record, and coordination of care.       Jarvis Godfrey III, DO  03/27/24

## 2024-03-27 NOTE — PLAN OF CARE
Problem: Fall Injury Risk  Goal: Absence of Fall and Fall-Related Injury  Outcome: Ongoing, Progressing   Goal Outcome Evaluation:              Outcome Evaluation: VSS. RA. No complaints of pain or nausea during the shift. Amiodarone gtt infusing. Pt remains in a-fib. No other concerns at this time. Will continue with the plan of care.

## 2024-03-27 NOTE — ED PROVIDER NOTES
Subjective   History of Present Illness  Patient presents for evaluation of acute onset palpitations similar to prior episodes of A-fib that occurred around 230 this afternoon when patient was undergoing a procedure at a dermatology appointment.  Patient's not having any chest pain or difficulty breathing.  He is not anticoagulated.  He has had 2 prior episodes of A-fib in his life.    History provided by:  Patient      Review of Systems    Past Medical History:   Diagnosis Date    A-fib        Allergies   Allergen Reactions    Doxycycline Other (See Comments)     Don't work on him     Erythromycin Other (See Comments)     Happened as a child can not remember reaction  Stomach upset       No past surgical history on file.    No family history on file.    Social History     Socioeconomic History    Marital status:    Tobacco Use    Smoking status: Never    Smokeless tobacco: Never   Substance and Sexual Activity    Alcohol use: No    Drug use: No    Sexual activity: Defer           Objective   Physical Exam  Constitutional:       General: He is not in acute distress.  HENT:      Head: Normocephalic and atraumatic.   Eyes:      Conjunctiva/sclera: Conjunctivae normal.      Pupils: Pupils are equal, round, and reactive to light.   Cardiovascular:      Rate and Rhythm: Tachycardia present. Rhythm irregular.      Pulses: Normal pulses.      Heart sounds: No murmur heard.     No gallop.   Pulmonary:      Effort: Pulmonary effort is normal. No respiratory distress.   Abdominal:      General: Abdomen is flat. There is no distension.      Tenderness: There is no abdominal tenderness.   Musculoskeletal:         General: No swelling or deformity. Normal range of motion.      Right lower leg: No edema.      Left lower leg: No edema.   Skin:     General: Skin is warm and dry.      Capillary Refill: Capillary refill takes less than 2 seconds.   Neurological:      General: No focal deficit present.      Mental Status: He is  alert and oriented to person, place, and time.   Psychiatric:         Mood and Affect: Mood normal.         Behavior: Behavior normal.         Critical Care    Performed by: Rafael Beasley MD  Authorized by: Rafael Beasley MD    Critical care provider statement:     Critical care time (minutes):  35    Critical care time was exclusive of:  Separately billable procedures and treating other patients    Critical care was necessary to treat or prevent imminent or life-threatening deterioration of the following conditions: Atrial fibrillation with rapid ventricular response.    Critical care was time spent personally by me on the following activities:  Development of treatment plan with patient or surrogate, discussions with consultants, evaluation of patient's response to treatment, examination of patient, obtaining history from patient or surrogate, ordering and performing treatments and interventions, ordering and review of laboratory studies, ordering and review of radiographic studies, pulse oximetry, re-evaluation of patient's condition and review of old charts    I assumed direction of critical care for this patient from another provider in my specialty: no      Care discussed with: admitting provider               ED Course  ED Course as of 03/27/24 0007   Tu Mar 26, 2024   2246 Twelve-lead ECG independently interpreted by myself demonstrates atrial fibrillation with rapid ventricular response, rate of 169, no ST segment elevation or depression. [KB]   2348 Chest x-ray independently interpreted by myself demonstrates no acute cardiopulmonary abnormality [KB]   Wed Mar 27, 2024   0006 Laboratory workup independently interpreted by myself notable for slight leukocytosis, mild hyponatremia and elevated liver enzymes [KB]      ED Course User Index  [KB] Rafael Beasley MD                                             Medical Decision Making  Differential diagnosis includes A-fib or other tachyarrhythmia, electrolyte  derangement, thyroid dysfunction.  Labs were ordered.  1 L IV fluid bolus and 20 mg IV diltiazem was given.    With IV diltiazem patient's heart rate improves but is still quite tachycardic.  Diltiazem infusion was initiated.  Patient remains overall well-appearing with normal blood pressures    Problems Addressed:  Atrial fibrillation with rapid ventricular response: complicated acute illness or injury    Amount and/or Complexity of Data Reviewed  Labs: ordered. Decision-making details documented in ED Course.  Radiology: ordered and independent interpretation performed. Decision-making details documented in ED Course.  ECG/medicine tests: ordered and independent interpretation performed. Decision-making details documented in ED Course.  Discussion of management or test interpretation with external provider(s): Hospital medicine consulted for admission    Risk  Prescription drug management.  Decision regarding hospitalization.    Critical Care  Total time providing critical care: 35 minutes        Final diagnoses:   Atrial fibrillation with rapid ventricular response       ED Disposition  ED Disposition       ED Disposition   Decision to Admit    Condition   --    Comment   --             Recent Results (from the past 24 hour(s))   ECG 12 Lead ED Triage Standing Order; Dysrhythmia    Collection Time: 03/26/24 10:31 PM   Result Value Ref Range    QT Interval 274 ms    QTC Interval 459 ms   Comprehensive Metabolic Panel    Collection Time: 03/26/24 10:39 PM    Specimen: Blood   Result Value Ref Range    Glucose 108 (H) 65 - 99 mg/dL    BUN 13 6 - 20 mg/dL    Creatinine 1.26 0.76 - 1.27 mg/dL    Sodium 133 (L) 136 - 145 mmol/L    Potassium 4.7 3.5 - 5.2 mmol/L    Chloride 96 (L) 98 - 107 mmol/L    CO2 26.0 22.0 - 29.0 mmol/L    Calcium 10.2 8.6 - 10.5 mg/dL    Total Protein 8.0 6.0 - 8.5 g/dL    Albumin 4.7 3.5 - 5.2 g/dL    ALT (SGPT) 55 (H) 1 - 41 U/L    AST (SGOT) 51 (H) 1 - 40 U/L    Alkaline Phosphatase 79 39 -  117 U/L    Total Bilirubin 1.1 0.0 - 1.2 mg/dL    Globulin 3.3 gm/dL    A/G Ratio 1.4 g/dL    BUN/Creatinine Ratio 10.3 7.0 - 25.0    Anion Gap 11.0 5.0 - 15.0 mmol/L    eGFR 72.1 >60.0 mL/min/1.73   Magnesium    Collection Time: 03/26/24 10:39 PM    Specimen: Blood   Result Value Ref Range    Magnesium 2.2 1.6 - 2.6 mg/dL   Green Top (Gel)    Collection Time: 03/26/24 10:39 PM   Result Value Ref Range    Extra Tube Hold for add-ons.    Lavender Top    Collection Time: 03/26/24 10:39 PM   Result Value Ref Range    Extra Tube hold for add-on    Gold Top - SST    Collection Time: 03/26/24 10:39 PM   Result Value Ref Range    Extra Tube Hold for add-ons.    Light Blue Top    Collection Time: 03/26/24 10:39 PM   Result Value Ref Range    Extra Tube Hold for add-ons.    CBC Auto Differential    Collection Time: 03/26/24 10:39 PM    Specimen: Blood   Result Value Ref Range    WBC 11.14 (H) 3.40 - 10.80 10*3/mm3    RBC 5.55 4.14 - 5.80 10*6/mm3    Hemoglobin 16.8 13.0 - 17.7 g/dL    Hematocrit 49.4 37.5 - 51.0 %    MCV 89.0 79.0 - 97.0 fL    MCH 30.3 26.6 - 33.0 pg    MCHC 34.0 31.5 - 35.7 g/dL    RDW 12.9 12.3 - 15.4 %    RDW-SD 42.5 37.0 - 54.0 fl    MPV 10.1 6.0 - 12.0 fL    Platelets 335 140 - 450 10*3/mm3    Neutrophil % 73.5 42.7 - 76.0 %    Lymphocyte % 17.1 (L) 19.6 - 45.3 %    Monocyte % 7.7 5.0 - 12.0 %    Eosinophil % 0.7 0.3 - 6.2 %    Basophil % 0.7 0.0 - 1.5 %    Immature Grans % 0.3 0.0 - 0.5 %    Neutrophils, Absolute 8.18 (H) 1.70 - 7.00 10*3/mm3    Lymphocytes, Absolute 1.91 0.70 - 3.10 10*3/mm3    Monocytes, Absolute 0.86 0.10 - 0.90 10*3/mm3    Eosinophils, Absolute 0.08 0.00 - 0.40 10*3/mm3    Basophils, Absolute 0.08 0.00 - 0.20 10*3/mm3    Immature Grans, Absolute 0.03 0.00 - 0.05 10*3/mm3    nRBC 0.0 0.0 - 0.2 /100 WBC     Note: In addition to lab results from this visit, the labs listed above may include labs taken at another facility or during a different encounter within the last 24 hours. Please  "correlate lab times with ED admission and discharge times for further clarification of the services performed during this visit.    XR Chest 1 View   Final Result   Impression:   No acute abnormality         Electronically Signed: Brendon Ramon DO     3/26/2024 10:57 PM EDT     Workstation ID: [id number]        Vitals:    03/26/24 2227 03/26/24 2244 03/26/24 2245 03/26/24 2330   BP: (!) 132/103 133/93  101/85   BP Location: Left arm      Patient Position: Sitting      Pulse: 90  (!) 160 (!) 122   Resp: 18      Temp: 97.9 °F (36.6 °C)      TempSrc: Oral      SpO2: 98%  98% 96%   Weight: (!) 143 kg (315 lb)      Height: 185.4 cm (73\")        Medications   sodium chloride 0.9 % flush 10 mL (has no administration in time range)   niCARdipine (CARDENE) 20 mg in 200 mL NS infusion (has no administration in time range)   dilTIAZem (CARDIZEM) injection 20 mg (20 mg Intravenous Given 3/26/24 2300)   sodium chloride 0.9 % bolus 1,000 mL (1,000 mL Intravenous New Bag 3/26/24 2300)     ECG/EMG Results (last 24 hours)       Procedure Component Value Units Date/Time    ECG 12 Lead ED Triage Standing Order; Dysrhythmia [[id number]] Collected: 03/26/24 2231     Updated: 03/26/24 2259     QT Interval 274 ms      QTC Interval 459 ms     Narrative:      Test Reason : ED Triage Standing Order~  Blood Pressure :   */*   mmHG  Vent. Rate : 169 BPM     Atrial Rate :   * BPM     P-R Int :   * ms          QRS Dur :  90 ms      QT Int : 274 ms       P-R-T Axes :   * -56  86 degrees     QTc Int : 459 ms    Atrial fibrillation with rapid ventricular response  Left anterior fascicular block  Minimal voltage criteria for LVH, may be normal variant ( [place] product )  Abnormal ECG  No previous ECGs available  Confirmed by MD ROMAN KYLE ([id number]) on 3/26/2024 10:59:04 PM    Referred By:            Confirmed By: FEDE ROMAN MD          ECG 12 Lead ED Triage Standing Order; Dysrhythmia   Final Result   Test Reason : ED Triage Standing Order~ "   Blood Pressure :   */*   mmHG   Vent. Rate : 169 BPM     Atrial Rate :   * BPM      P-R Int :   * ms          QRS Dur :  90 ms       QT Int : 274 ms       P-R-T Axes :   * -56  86 degrees      QTc Int : 459 ms      Atrial fibrillation with rapid ventricular response   Left anterior fascicular block   Minimal voltage criteria for LVH, may be normal variant ( [place] product    )   Abnormal ECG   No previous ECGs available   Confirmed by MD ROMAN KYLE ([id number]) on 3/26/2024 10:59:04 PM      Referred By:            Confirmed By: FEDE ROMAN MD              No follow-up provider specified.       Medication List      No changes were made to your prescriptions during this visit.            Fede Roman MD  03/27/24 0007

## 2024-03-27 NOTE — PROGRESS NOTES
Psychiatric Medicine Services  PROGRESS NOTE    Patient Name: Neo Stern  : 1980  MRN: [id number]    Date of Admission: 3/26/2024  Primary Care Physician: Lea Archer APRN    Subjective   Subjective     CC:  acute palpitations    HPI:  no chest pain, ongoing palpitations.        Objective   Objective     Vital Signs:   Temp:  [97.8 °F (36.6 °C)-98.6 °F (37 °C)] 98.6 °F (37 °C)  Heart Rate:  [] 96  Resp:  [16-20] 16  BP: ()/() 119/72     Physical Exam:  Gen:  WD/WN man in bed, NAD, visitor present   Neuro: alert and oriented, clear speech, follows commands, grossly nonfocal  HEENT:  NC/AT  Neck:  Supple, no LAD  Heart  irreg irreg no murmur.  Rate varying from 110 to 150 during my stay   Abd:  Soft, nontender, no rebound or guarding, pos BS  Extrem:  No c/c/e      Results Reviewed:  LAB RESULTS:      Lab 24   WBC  --  11.14*   HEMOGLOBIN  --  16.8   HEMATOCRIT  --  49.4   PLATELETS  --  335   NEUTROS ABS  --  8.18*   IMMATURE GRANS (ABS)  --  0.03   LYMPHS ABS  --  1.91   MONOS ABS  --  0.86   EOS ABS  --  0.08   MCV  --  89.0   PROCALCITONIN 0.04  --          Lab 24   SODIUM  --  133*   POTASSIUM  --  4.7   CHLORIDE  --  96*   CO2  --  26.0   ANION GAP  --  11.0   BUN  --  13   CREATININE  --  1.26   EGFR  --  72.1   GLUCOSE  --  108*   CALCIUM  --  10.2   MAGNESIUM  --  2.2   TSH 1.950  --          Lab 24   TOTAL PROTEIN 8.0   ALBUMIN 4.7   GLOBULIN 3.3   ALT (SGPT) 55*   AST (SGOT) 51*   BILIRUBIN 1.1   ALK PHOS 79         Lab 24   PROBNP 589.5*   HSTROP T 36*                 Brief Urine Lab Results  (Last result in the past 365 days)        Color   Clarity   Blood   Leuk Est   Nitrite   Protein   CREAT   Urine HCG        24 0601 Yellow   Clear   Negative   Negative   Negative   Negative                   Microbiology Results Abnormal       None            XR Chest 1  View    Result Date: 3/26/2024  XR CHEST 1 VW Date of Exam: 3/26/2024 10:41 PM EDT Indication: Dysrhythmia triage protocol Comparison: 2/25/2017 Findings: Lines: None Lungs: The lungs are clear. Pleura: No pleural effusion or pneumothorax. Cardiomediastinum: The cardiomediastinal silhouette is normal Soft Tissues: Unremarkable. Bones: No acute osseous abnormality.     Impression: Impression: No acute abnormality Electronically Signed: Brendon Ramon DO  3/26/2024 10:57 PM EDT  Workstation ID: [id number]         Current medications:  Scheduled Meds:enoxaparin, 40 mg, Subcutaneous, Q12H  sodium chloride, 10 mL, Intravenous, Q12H      Continuous Infusions:dilTIAZem, 5-15 mg/hr, Last Rate: 15 mg/hr (03/27/24 0817)  sodium chloride, 75 mL/hr, Last Rate: 75 mL/hr (03/27/24 0701)      PRN Meds:.  acetaminophen **OR** acetaminophen **OR** acetaminophen    senna-docusate sodium **AND** polyethylene glycol **AND** bisacodyl **AND** bisacodyl    hydrOXYzine    melatonin    ondansetron ODT **OR** ondansetron    sodium chloride    sodium chloride    sodium chloride    Assessment & Plan   Assessment & Plan     Active Hospital Problems    Diagnosis  POA    **Atrial fibrillation with RVR [I48.91]  Yes    HTN (hypertension) [I10]  Yes    Leukocytosis [D72.829]  Yes    Situational anxiety [F41.8]  Yes    PAF (paroxysmal atrial fibrillation) [I48.0]  Yes      Resolved Hospital Problems   No resolved problems to display.        Brief Hospital Course to date:  Neo Stern is a 43 y.o. male w/ a hx of HTN, HLD, PAF who presented to the ED w/ c/o palpitations.  Has infrequent episodes through the years; does not follow closedly with Cardiol      PAFib  - not on rate/rhythm control or anticoag at home  - dilt gtt.  Add oral beta blocker  - echo and cards consult pending  - he is not NPO.  Discussed possibility of ECV however     HTN - hold ARB.      Expected Discharge Location and Transportation: home by car  Expected Discharge    Expected discharge date/ time has not been documented.     DVT prophylaxis:  Medical DVT prophylaxis orders are present.         AM-PAC 6 Clicks Score (PT): 24 (03/27/24 8390)    CODE STATUS:   Code Status and Medical Interventions:   Ordered at: 03/27/24 0332     Code Status (Patient has no pulse and is not breathing):    CPR (Attempt to Resuscitate)     Medical Interventions (Patient has pulse or is breathing):    Full Support       Kassidy Colunga MD  03/27/24

## 2024-03-27 NOTE — CASE MANAGEMENT/SOCIAL WORK
Continued Stay Note  [facility]     Patient Name: Neo Stern  MRN: [id number]  Today's Date: 3/27/2024    Admit Date: 3/26/2024    Plan: Home   Discharge Plan       Row Name 03/27/24 1152       Plan    Plan Home    Patient/Family in Agreement with Plan yes    Plan Comments Derrick Turner spoke with Mr. Stern and his wife, at bedside. Pt has no needs, at this time. He lives in [place] with his wife. Cardiology is following. Pt will likely be discharged home tomorrow. Currently on IV Amiodarone. CM reviewed pt's chart. No needs identified. Wife will transport at discharge.  will continue to follow.    Final Discharge Disposition Code 01 - home or self-care                   Discharge Codes    No documentation.                 Expected Discharge Date and Time       Expected Discharge Date Expected Discharge Time    Mar 27, 2024               Ema Valle RN  Continued Stay Note   [facility]     Patient Name: Neo Stern  MRN: [id number]  Today's Date: 3/27/2024    Admit Date: 3/26/2024    Plan: Home   Discharge Plan       Row Name 03/27/24 1152       Plan    Plan Home    Patient/Family in Agreement with Plan yes    Plan Comments Derrick Turner spoke with Mr. Stern and his wife, at bedside. Pt has no needs, at this time. He lives in [place] with his wife. Cardiology is following. Pt will likely be discharged home tomorrow. Currently on IV Amiodarone. CM reviewed pt's chart. No needs identified. Wife will transport at discharge.  will continue to follow.    Final Discharge Disposition Code 01 - home or self-care                   Discharge Codes    No documentation.                 Expected Discharge Date and Time       Expected Discharge Date Expected Discharge Time    Mar 27, 2024               Ema Valle RN

## 2024-03-28 LAB
QT INTERVAL: 332 MS
QT INTERVAL: 344 MS
QTC INTERVAL: 486 MS
QTC INTERVAL: 488 MS

## 2024-03-28 PROCEDURE — 93005 ELECTROCARDIOGRAM TRACING: CPT | Performed by: PHYSICIAN ASSISTANT

## 2024-03-28 PROCEDURE — 25010000002 AMIODARONE IN DEXTROSE 5% 360-4.14 MG/200ML-% SOLUTION: Performed by: PHYSICIAN ASSISTANT

## 2024-03-28 PROCEDURE — 93010 ELECTROCARDIOGRAM REPORT: CPT | Performed by: INTERNAL MEDICINE

## 2024-03-28 PROCEDURE — 99232 SBSQ HOSP IP/OBS MODERATE 35: CPT | Performed by: INTERNAL MEDICINE

## 2024-03-28 PROCEDURE — 93005 ELECTROCARDIOGRAM TRACING: CPT | Performed by: INTERNAL MEDICINE

## 2024-03-28 PROCEDURE — 25010000002 AMIODARONE IN DEXTROSE 5% 150-4.21 MG/100ML-% SOLUTION: Performed by: PHYSICIAN ASSISTANT

## 2024-03-28 RX ORDER — ROSUVASTATIN CALCIUM 20 MG/1
20 TABLET, COATED ORAL NIGHTLY
Status: DISCONTINUED | OUTPATIENT
Start: 2024-03-28 | End: 2024-03-29 | Stop reason: HOSPADM

## 2024-03-28 RX ORDER — LORAZEPAM 0.5 MG/1
0.5 TABLET ORAL EVERY 8 HOURS PRN
Status: DISCONTINUED | OUTPATIENT
Start: 2024-03-28 | End: 2024-03-29 | Stop reason: HOSPADM

## 2024-03-28 RX ADMIN — METOPROLOL TARTRATE 25 MG: 25 TABLET, FILM COATED ORAL at 07:05

## 2024-03-28 RX ADMIN — ROSUVASTATIN 20 MG: 20 TABLET, FILM COATED ORAL at 21:06

## 2024-03-28 RX ADMIN — Medication 10 ML: at 09:03

## 2024-03-28 RX ADMIN — AMIODARONE HYDROCHLORIDE 150 MG: 1.5 INJECTION, SOLUTION INTRAVENOUS at 09:35

## 2024-03-28 RX ADMIN — RIVAROXABAN 20 MG: 20 TABLET, FILM COATED ORAL at 21:08

## 2024-03-28 RX ADMIN — AMIODARONE HYDROCHLORIDE 1 MG/MIN: 1.8 INJECTION, SOLUTION INTRAVENOUS at 22:28

## 2024-03-28 RX ADMIN — AMIODARONE HYDROCHLORIDE 1 MG/MIN: 1.8 INJECTION, SOLUTION INTRAVENOUS at 03:46

## 2024-03-28 RX ADMIN — METOPROLOL TARTRATE 25 MG: 25 TABLET, FILM COATED ORAL at 16:38

## 2024-03-28 RX ADMIN — AMIODARONE HYDROCHLORIDE 1 MG/MIN: 1.8 INJECTION, SOLUTION INTRAVENOUS at 17:04

## 2024-03-28 RX ADMIN — METOPROLOL TARTRATE 25 MG: 25 TABLET, FILM COATED ORAL at 21:06

## 2024-03-28 RX ADMIN — AMIODARONE HYDROCHLORIDE 1 MG/MIN: 1.8 INJECTION, SOLUTION INTRAVENOUS at 09:35

## 2024-03-28 NOTE — PLAN OF CARE
Goal Outcome Evaluation:      VSS. Amio gtt continues per MD order. Afib on tele with rates in 110-120s. Pt without complaints.

## 2024-03-28 NOTE — PAYOR COMM NOTE
"Boris Hayes (43 y.o. Male)       Date of Birth   1980    Social Security Number       Address   [address] [id number]    Home Phone   [phone]    MRN   [id number]       Rastafari   Judaism    Marital Status                               Admission Date   3/26/24    Admission Type   Emergency    Admitting Provider   Chris Cruz MD    Attending Provider   Kassidy Colunga MD    Department, Room/Bed   [address], S213/1       Discharge Date       Discharge Disposition       Discharge Destination                                 Attending Provider: Kassidy Colunga MD    Allergies: Doxycycline, Erythromycin    Isolation: None   Infection: None   Code Status: CPR    Ht: 185.4 cm (73\")   Wt: 143 kg (315 lb)    Admission Cmt: None   Principal Problem: Atrial fibrillation with RVR [I48.91]                   Active Insurance as of 3/26/2024       Primary Coverage       Payor Plan Insurance Group Employer/Plan Group    OUSMANE ROMEO [id number]       Payor Plan Address Payor Plan Phone Number Payor Plan Fax Number Effective Dates    [address] [phone]  2022 - None Entered    [facility] [id number]         Subscriber Name Subscriber Birth Date Member ID       BORIS HAYES 1980 [id number]                     Emergency Contacts        (Rel.) Home Phone Work Phone Mobile Phone    Jennifer Hayes (Spouse) [phone] -- --             [address]  [address] [facility] [address]  Phone:  [phone]  Fax:  [phone]        Patient:     Boris Hayes MRN:  [id number]   [address] [facility] [address] :  1980  SSN:    Phone: [phone] Sex:  M      INSURANCE PAYOR PLAN GROUP # SUBSCRIBER ID   Primary:    WOODYRA [id number]   Admitting Diagnosis: A-fib [I48.91]  Order Date:  Mar 28, 2024        Inpatient Admission       (Order ID: [id number])     Diagnosis:    "      Priority:  Routine Expected Date:   Expiration Date:        Interval:   Count:    Level of Care: Telemetry [5]  Diagnosis: Atrial fibrillation with RVR [193609]  Admitting Physician: RODO FIELDS [[id number]]  Attending Physician: JUSTINO COLUNGA [[id number]]  Certification: I Certify That Inpatient Hospital Services Are Medically Necessary For Greater Than 2 Midnights     Specimen Type:   Specimen Source:   Specimen Taken Date:   Specimen Taken Time:                  Verbal Order Mode: Verbal with readback   Authorizing Provider: Justino Colunga MD  Authorizing Provider's NPI: [id number]     Order Entered By: Cameron Jonas RN 3/28/2024  8:28 AM     Electronically signed by:            History & Physical        Jarvis Godfrey III, DO at 24 0302              [facility] Medicine Services  HISTORY AND PHYSICAL    Patient Name: Neo Stern  : 1980  MRN: [id number]  Primary Care Physician: Lea Archer APRN  Date of admission: 3/26/2024    Subjective  Subjective     Chief Complaint:  Palpitations     HPI:  Neo Stern is a 43 y.o. male w/ a hx of HTN, HLD, paroxysmal atrial fibrillation who presented to the ED w/ c/o palpitations.   Pt first diagnosed w/ atrial fibrillation at 27 yrs ago. Pt reports that he was seen at an ED and given medication and converted to NSR. At that time he was started on a beta blocker and coumadin. He was referred to EP Cardiology (no intervention done at that time). Pt stopped the BB and coumadin some time after the episode. Pt did not experience another episode of A.Fib until 2018. He describes that he had been sick and developed palpitations and was evaluated at [facility] and found to be in A.Fib. Pt was given Cardizem. Pt was then referred to Dr. Bob w/ Cardiology. Pt reports that he was not started on medication and has not had a f/u in some time. Since 2018, pt reports that he will very randomly and infrequently  "have brief episodes of palpitations that resolve quickly w/ no associated symptoms.   Tuesday afternoon pt was seen at Dermatology for a skin tag removal. He describes that after he was given two shots of lidocaine he stood up and felt suddenly faint. He did not have LOC but felt as though he may pass out. Simultaneously he developed palpitations and \"fluttering\". Pt went home and continued to have a \"fluttering\" sensation. He checked his pulse and noted it to be ~109bpm prompting his ED visit.   Pt evaluated in the ED. EKG c/w A.Fib RVR (rate 160's). Troponin 36, proBNP 589. Pt admitted to the hospital medicine service for further evaluation.     Review of Systems   Constitutional: Negative.  Negative for chills, diaphoresis, fatigue, fever and unexpected weight change.   HENT: Negative.  Negative for congestion, postnasal drip, rhinorrhea, sinus pressure, sinus pain, sneezing, sore throat and trouble swallowing.    Eyes: Negative.  Negative for visual disturbance.   Respiratory: Negative.  Negative for cough, shortness of breath and wheezing.    Cardiovascular:  Positive for palpitations. Negative for chest pain and leg swelling.   Gastrointestinal: Negative.  Negative for abdominal distention, abdominal pain, blood in stool, constipation, diarrhea, nausea and vomiting.   Endocrine: Negative.    Genitourinary: Negative.  Negative for decreased urine volume, difficulty urinating, dysuria, frequency and urgency.   Musculoskeletal: Negative.  Negative for arthralgias, back pain, myalgias, neck pain and neck stiffness.   Skin: Negative.  Negative for wound.   Allergic/Immunologic: Negative.  Negative for immunocompromised state.   Neurological: Negative.  Negative for dizziness, tremors, seizures, syncope, facial asymmetry, speech difficulty, weakness, light-headedness, numbness and headaches.        Near-syncope.    Hematological: Negative.  Does not bruise/bleed easily.   Psychiatric/Behavioral:  Negative for " confusion. The patient is nervous/anxious.    All other systems reviewed and are negative.     Personal History     Past Medical History:   Diagnosis Date    A-fib     HLD (hyperlipidemia)     Hypertension      Past Surgical History:   Procedure Laterality Date    NO PAST SURGERIES       Family History: family history includes Cancer in his father; Hypertension in his father; No Known Problems in his mother.     Social History:  reports that he has never smoked. He has never used smokeless tobacco. He reports that he does not drink alcohol and does not use drugs.  Social History     Social History Narrative    Not on file     Medications:  amoxicillin    Allergies   Allergen Reactions    Doxycycline Other (See Comments)     Don't work on him     Erythromycin Other (See Comments)     Happened as a child can not remember reaction  Stomach upset     Objective  Objective     Vital Signs:   Temp:  [97.9 °F (36.6 °C)] 97.9 °F (36.6 °C)  Heart Rate:  [] 130  Resp:  [18] 18  BP: ()/() 120/72    Physical Exam     Constitutional: Awake, alert; non-toxic appearing   Eyes: PERRLA, sclerae anicteric, no conjunctival injection  HENT: NCAT, mucous membranes moist  Neck: Supple, no thyromegaly, no lymphadenopathy, trachea midline  Respiratory: Clear to auscultation bilaterally, nonlabored respirations   Cardiovascular: Irregular rate and rhythm; no murmurs, rubs, or gallops, no peripheral edema   Gastrointestinal: Positive bowel sounds, soft, nontender, nondistended  Musculoskeletal: Normal ROM bilaterally   Psychiatric: Appropriate affect, cooperative  Neurologic: Oriented x 3, strength symmetric in all extremities, Cranial Nerves grossly intact to confrontation, speech clear  Skin: No rashes, lesions or wounds     Result Review:  I have personally reviewed the results from the time of this admission to 3/27/2024 03:59 EDT and agree with these findings:  [x]  Laboratory list / accordion  []  Microbiology  [x]   Radiology  [x]  EKG/Telemetry   []  Cardiology/Vascular   []  Pathology  [x]  Old records    LAB RESULTS:      Lab 03/26/24 2239   WBC 11.14*   HEMOGLOBIN 16.8   HEMATOCRIT 49.4   PLATELETS 335   NEUTROS ABS 8.18*   IMMATURE GRANS (ABS) 0.03   LYMPHS ABS 1.91   MONOS ABS 0.86   EOS ABS 0.08   MCV 89.0         Lab 03/27/24  0254 03/26/24 2239   SODIUM  --  133*   POTASSIUM  --  4.7   CHLORIDE  --  96*   CO2  --  26.0   ANION GAP  --  11.0   BUN  --  13   CREATININE  --  1.26   EGFR  --  72.1   GLUCOSE  --  108*   CALCIUM  --  10.2   MAGNESIUM  --  2.2   TSH 1.950  --          Lab 03/26/24 2239   TOTAL PROTEIN 8.0   ALBUMIN 4.7   GLOBULIN 3.3   ALT (SGPT) 55*   AST (SGOT) 51*   BILIRUBIN 1.1   ALK PHOS 79         Lab 03/27/24 0254   PROBNP 589.5*   HSTROP T 36*                 Brief Urine Lab Results       None          Microbiology Results (last 10 days)       ** No results found for the last 240 hours. **            XR Chest 1 View    Result Date: 3/26/2024  XR CHEST 1 VW Date of Exam: 3/26/2024 10:41 PM EDT Indication: Dysrhythmia triage protocol Comparison: 2/25/2017 Findings: Lines: None Lungs: The lungs are clear. Pleura: No pleural effusion or pneumothorax. Cardiomediastinum: The cardiomediastinal silhouette is normal Soft Tissues: Unremarkable. Bones: No acute osseous abnormality.     Impression: Impression: No acute abnormality Electronically Signed: Brendon Ramon DO  3/26/2024 10:57 PM EDT  Workstation ID: [id number]       Assessment & Plan  Assessment & Plan       Atrial fibrillation with RVR    HTN (hypertension)    Leukocytosis    Situational anxiety    PAF (paroxysmal atrial fibrillation)    Neo Stern is a 43 y.o. male w/ a hx of HTN, HLD, paroxysmal atrial fibrillation who presented to the ED w/ c/o palpitations.     **Atrial fibrillation w/ RVR   **Hx of PAF   -first episode of A.Fib at 27 yrs old, given mediation in OSH ED and converted to NSR; deferred to EP Cardiology and started on  "coumadin and BB but stopped taking in months after episode   -second episode in 2018 (seen at [facility] ED), given Cardizem and referred to Dr. Lisbeth peoples initial rates in 160's   -EKG c/w A.Fib RVR; repeat this morning  -troponin 36; repeat/trend  -proBNP 589  -CXR unremarkable  -s/p 2 liters NS bolus given in ED  -continue NS @ 75ml/hour x 12 hours   -Cardizem infusion started in ED; continue per protocol   -tsh, FLP, hem A1c, coags- pending   -ECHO pending   -symptom mgt  -Cardiology consult this morning      **Leukocytosis   -very mild; WBC 11.14  -CXR unremarkable   -lactic, procal added to am labs   -UA Pending   -monitor     **HTN   **HLD   -pt takes Losartan routinely; awaiting med rec to verify dose   -pt reports that he was prescribed a statin ~1 month ago but never started taking  -Lipid Panel pending     **Situational anxiety  -PRN Atarax     DVT prophylaxis:  Lovenox     CODE STATUS:    Code Status (Patient has no pulse and is not breathing): CPR (Attempt to Resuscitate)  Medical Interventions (Patient has pulse or is breathing): Full Support    Expected Discharge tbd    This note has been completed as part of a split-shared workflow.     Signature: Electronically signed by ROE Tinsley, 03/27/24, 3:59 AM EDT.    Time spent: 55 minutes       Attending   Admission Attestation       I have performed an independent face-to-face diagnostic evaluation including performing an independent physical examination.  I approve of the documented plan of care above that was reviewed and developed with the advanced practice clinician (APC) and take responsibility for that plan along with its associated risks.  I have updated the HPI as appropriate.    Brief HPI    43 M states that earlier today (Tuesday 3/26) the patient was at a dermatology appointment for skin tag removal, was given lidocaine injection to the area as part of a local anesthetic.  He states he eventually stood up and \"suddenly " "felt like I was going to pass out.\"  He states he did not lose consciousness but suffered a near syncopal episode.  He states soon after this he noticed \"the fluttering in my chest.\"  He states he has been diagnosed with atrial fibrillation in the past, specifically when he was 27 years old (16 years ago).  He states that after he was worked up for this including seeing an electrophysiologist, he never had any procedure and was not prescribed any medication, though he did take a beta-blocker and warfarin for some time; he states it was eventually stopped by another cardiologist.  He had another episode of atrial fibrillation in 2018, was given Cardizem at that time, but not discharged on anything.  He states that this is the first episode of sustained atrial fibrillation he has had since then, though he does have occasional periods where he notices palpitations and tachycardia which resolve on their own.  During my visit he is still on diltiazem drip with heart rate ranging between 100-150.  He denies any chest pain or dyspnea.    Attending Physical Exam:  Temp:  [97.8 °F (36.6 °C)-97.9 °F (36.6 °C)] 97.8 °F (36.6 °C)  Heart Rate:  [] 138  Resp:  [18-20] 20  BP: ()/() 156/94    Constitutional: Awake, alert, NAD, very pleasant.  Eyes: PERRLA, sclerae anicteric, no conjunctival injection  HENT: NCAT, mucous membranes moist  Neck: Supple, no thyromegaly, no lymphadenopathy, trachea midline  Respiratory: Clear to auscultation bilaterally, nonlabored respirations   Cardiovascular: Tachycardic, irregularly irregular, no murmurs, rubs, or gallops, palpable pedal pulses bilaterally  Gastrointestinal: Positive bowel sounds, soft, nontender, nondistended  Musculoskeletal: No bilateral ankle edema, no clubbing or cyanosis to extremities  Psychiatric: Appropriate affect, cooperative  Neurologic: Oriented x 3, strength symmetric in all extremities, Cranial Nerves grossly intact to confrontation, speech " clear  Skin: No rashes, normal turgor.    Result Review:  I have personally reviewed the results from the time of this admission to 3/27/2024 05:35 EDT and agree with these findings:  [x]  Laboratory list / accordion  []  Microbiology  [x]  Radiology  [x]  EKG/Telemetry   []  Cardiology/Vascular   []  Pathology  []  Old records  []  Other:  Most notable findings include: I reviewed chest x-ray which per my read shows no acute infiltrate/edema on this single AP view.  I reviewed EKG which by my read shows atrial fibrillation with RVR, ventricular rate approximately 170 bpm, left axis, nonspecific ST/T wave changes.    Assessment and Plan:    See assessment and plan documented by APC above and updated/edited by me as appropriate.      Total time spent: 41 minutes  Time spent includes time reviewing chart, face-to-face time, counseling patient/family/caregiver, ordering medications/tests/procedures, communicating with other health care professionals, documenting clinical information in the electronic health record, and coordination of care.       Jarvis Godfrey III, DO  03/27/24                         Electronically signed by Jarvis Godfrey III, DO at 03/27/24 0541       Facility-Administered Medications as of 3/28/2024   Medication Dose Route Frequency Provider Last Rate Last Admin    acetaminophen (TYLENOL) tablet 650 mg  650 mg Oral Q4H PRN Olimpia Acharya APRN        Or    acetaminophen (TYLENOL) 160 MG/5ML oral solution 650 mg  650 mg Oral Q4H PRN Olimpia Acharya APRN        Or    acetaminophen (TYLENOL) suppository 650 mg  650 mg Rectal Q4H PRN Olimpia Acharya APRN        [COMPLETED] amiodarone 150 mg in 100 mL D5W (loading dose)  150 mg Intravenous Once Case, RANDAL Donis   150 mg at 03/27/24 1009    [COMPLETED] amiodarone 150 mg in 100 mL D5W (loading dose)  150 mg Intravenous Once Case, RANDAL Donis   150 mg at 03/28/24 0935    amiodarone 360 mg in 200 mL D5W infusion  1 mg/min  Intravenous Continuous Susan Meza PA 33.3 mL/hr at 24 0935 1 mg/min at 24 0935    sennosides-docusate (PERICOLACE) 8.6-50 MG per tablet 2 tablet  2 tablet Oral BID PRN Olimpia Acharya APRN        And    polyethylene glycol (MIRALAX) packet 17 g  17 g Oral Daily PRN Olimpia Acharya APRN        And    bisacodyl (DULCOLAX) EC tablet 5 mg  5 mg Oral Daily PRN Olimpia Acharya APRN        And    bisacodyl (DULCOLAX) suppository 10 mg  10 mg Rectal Daily PRN Olimpia Acharya APRN        [COMPLETED] dilTIAZem (CARDIZEM) injection 20 mg  20 mg Intravenous Once Rafael Beasley MD   20 mg at 24 2300    hydrOXYzine (ATARAX) tablet 25 mg  25 mg Oral TID PRN Olimpia Acharya APRN        LORazepam (ATIVAN) tablet 0.5 mg  0.5 mg Oral Q8H PRN Kassidy Colunga MD        melatonin tablet 5 mg  5 mg Oral Nightly PRN Olimpia Acharya APRN        metoprolol tartrate (LOPRESSOR) tablet 25 mg  25 mg Oral Q12H Kassidy Colunga MD   25 mg at 24 0705    ondansetron ODT (ZOFRAN-ODT) disintegrating tablet 4 mg  4 mg Oral Q6H PRN Olimpia Acharya APRN        Or    ondansetron (ZOFRAN) injection 4 mg  4 mg Intravenous Q6H PRN Olimpia Acharya APRN        rivaroxaban (XARELTO) tablet 20 mg  20 mg Oral Daily With Dinner Susan Meza PA   20 mg at 24 1827    rosuvastatin (CRESTOR) tablet 20 mg  20 mg Oral Nightly Susan Meza PA        [COMPLETED] sodium chloride 0.9 % bolus 1,000 mL  1,000 mL Intravenous Once Rafael Beasley MD   Stopped at 24 0027    [COMPLETED] sodium chloride 0.9 % bolus 1,000 mL  1,000 mL Intravenous Once Jarvis Godfrey III, DO 1,000 mL/hr at 24 0320 1,000 mL at 24 0320    sodium chloride 0.9 % flush 10 mL  10 mL Intravenous Q12H Olimpia Acharya APRN   10 mL at 24 0903    sodium chloride 0.9 % flush 10 mL  10 mL Intravenous PRN Olimpia Acharya APRN        sodium chloride 0.9 % infusion 40 mL  40 mL Intravenous PRN Olimpia Acharya APRN        [] sodium  chloride 0.9 % infusion  75 mL/hr Intravenous Continuous Olimpia Acharya APRN 75 mL/hr at 24 0701 75 mL/hr at 24 0701    zolpidem (AMBIEN) tablet 5 mg  5 mg Oral Nightly PRN Kassidy Colunga MD            Physician Progress Notes (all)        Kassidy Colunga MD at 24 0903              [facility] Medicine Services  PROGRESS NOTE    Patient Name: Neo Stern  : 1980  MRN: [id number]    Date of Admission: 3/26/2024  Primary Care Physician: Lea Archer APRN    Subjective   Subjective     CC:  acute palpitations    HPI:  no chest pain, ongoing palpitations.  Anxious.        Objective   Objective     Vital Signs:   Temp:  [97.8 °F (36.6 °C)-98.4 °F (36.9 °C)] 97.9 °F (36.6 °C)  Heart Rate:  [] 131  Resp:  [18-26] 23  BP: ()/() 120/77     Physical Exam:  Gen:  WD/WN man in bed, NAD, visitor present   Neuro: alert and oriented, clear speech, follows commands, grossly nonfocal  HEENT:  NC/AT  Neck:  Supple, no LAD  Heart  irreg irreg no murmur. 120s.    Abd:  Soft, nontender, no rebound or guarding, pos BS  Extrem:  No c/c/e      Results Reviewed:  LAB RESULTS:      Lab 24  1404 24  1113 24  0254 24  2239   WBC  --  11.58*  --  11.14*   HEMOGLOBIN  --  16.2  --  16.8   HEMATOCRIT  --  47.5  --  49.4   PLATELETS  --  318  --  335   NEUTROS ABS  --  8.81*  --  8.18*   IMMATURE GRANS (ABS)  --  0.03  --  0.03   LYMPHS ABS  --  1.78  --  1.91   MONOS ABS  --  0.80  --  0.86   EOS ABS  --  0.10  --  0.08   MCV  --  90.6  --  89.0   PROCALCITONIN  --   --  0.04  --    LACTATE 1.7 2.1*  --   --          Lab 24  1113 24  0254 24  7306   SODIUM 136  --  133*   POTASSIUM 4.6  --  4.7   CHLORIDE 101  --  96*   CO2 26.0  --  26.0   ANION GAP 9.0  --  11.0   BUN 12  --  13   CREATININE 1.26  --  1.26   EGFR 72.1  --  72.1   GLUCOSE 115*  --  108*   CALCIUM 10.0  --  10.2   MAGNESIUM  --   --  2.2   HEMOGLOBIN A1C 5.20  --    --    TSH  --  1.950  --          Lab 03/26/24  2239   TOTAL PROTEIN 8.0   ALBUMIN 4.7   GLOBULIN 3.3   ALT (SGPT) 55*   AST (SGOT) 51*   BILIRUBIN 1.1   ALK PHOS 79         Lab 03/27/24  0254   PROBNP 589.5*   HSTROP T 36*         Lab 03/27/24  1113   CHOLESTEROL 265*   LDL CHOL 178*   HDL CHOL 29*   TRIGLYCERIDES 298*             Brief Urine Lab Results  (Last result in the past 365 days)        Color   Clarity   Blood   Leuk Est   Nitrite   Protein   CREAT   Urine HCG        03/27/24 0601 Yellow   Clear   Negative   Negative   Negative   Negative                   Microbiology Results Abnormal       None            Adult Transthoracic Echo Complete W/ Cont if Necessary Per Protocol    Result Date: 3/27/2024    Left ventricular ejection fraction appears to be 61 - 65%.   Left ventricular wall thickness is consistent with mild concentric hypertrophy.     XR Chest 1 View    Result Date: 3/26/2024  XR CHEST 1 VW Date of Exam: 3/26/2024 10:41 PM EDT Indication: Dysrhythmia triage protocol Comparison: 2/25/2017 Findings: Lines: None Lungs: The lungs are clear. Pleura: No pleural effusion or pneumothorax. Cardiomediastinum: The cardiomediastinal silhouette is normal Soft Tissues: Unremarkable. Bones: No acute osseous abnormality.     Impression: Impression: No acute abnormality Electronically Signed: Brendon Ramon DO  3/26/2024 10:57 PM EDT  Workstation ID: [id number]     Results for orders placed during the hospital encounter of 03/26/24    Adult Transthoracic Echo Complete W/ Cont if Necessary Per Protocol    Interpretation Summary    Left ventricular ejection fraction appears to be 61 - 65%.    Left ventricular wall thickness is consistent with mild concentric hypertrophy.      Current medications:  Scheduled Meds:amiodarone, 150 mg, Intravenous, Once  metoprolol tartrate, 25 mg, Oral, Q12H  rivaroxaban, 20 mg, Oral, Daily With Dinner  sodium chloride, 10 mL, Intravenous, Q12H      Continuous  Infusions:amiodarone, 1 mg/min      PRN Meds:.  acetaminophen **OR** acetaminophen **OR** acetaminophen    senna-docusate sodium **AND** polyethylene glycol **AND** bisacodyl **AND** bisacodyl    hydrOXYzine    LORazepam    melatonin    ondansetron ODT **OR** ondansetron    sodium chloride    sodium chloride    zolpidem    Assessment & Plan   Assessment & Plan     Active Hospital Problems    Diagnosis  POA    **Atrial fibrillation with RVR [I48.91]  Yes    HTN (hypertension) [I10]  Yes    Leukocytosis [D72.829]  Yes    Situational anxiety [F41.8]  Yes    PAF (paroxysmal atrial fibrillation) [I48.0]  Yes      Resolved Hospital Problems   No resolved problems to display.        Brief Hospital Course to date:  Neo Stern is a 43 y.o. male w/ a hx of HTN, HLD, PAF who presented to the ED w/ c/o palpitations.  Has infrequent episodes through the years; does not follow closedly with Cardiol      PAFib  - not on rate/rhythm control or anticoag at home  - amio gtt.  Still has not converted.  Plan for CAROL ECV tomorrow     Anxiety  - atarax, benzos for now.   - due to amio, avoid qt prolonging agents     HTN - hold ARB.      Expected Discharge Location and Transportation: home by car  Expected Discharge   Expected Discharge Date: 3/27/2024; Expected Discharge Time:      DVT prophylaxis:  Medical DVT prophylaxis orders are present.         AM-PAC 6 Clicks Score (PT): 24 (03/28/24 0300)    CODE STATUS:   Code Status and Medical Interventions:   Ordered at: 03/27/24 0336     Code Status (Patient has no pulse and is not breathing):    CPR (Attempt to Resuscitate)     Medical Interventions (Patient has pulse or is breathing):    Full Support       Kassidy Colunga MD  03/28/24        Electronically signed by Kassidy Colunga MD at 03/28/24 0934       Montrell Oneill MD at 03/28/24 3039           [facility] - Progress Note    Neo Stern  1980  S213/1 03/28/24, 07:52 EDT      Chief Complaint:  "Following for PAF    Subjective:   Remains in A-fib, rates somewhat better controlled.  Very anxious.  Slept somewhat better last night.  Denies dyspnea, denies chest pain    Review of Systems:  Pertinent positives are listed above and in physical exam.  All others have been reviewed and are negative.    metoprolol tartrate, 25 mg, Oral, Q12H  rivaroxaban, 20 mg, Oral, Daily With Dinner  sodium chloride, 10 mL, Intravenous, Q12H        Objective:  Vitals:   height is 185.4 cm (73\") and weight is 143 kg (315 lb) (abnormal). His oral temperature is 98.3 °F (36.8 °C). His blood pressure is 120/77 and his pulse is 109. His respiration is 23 and oxygen saturation is 94%.     Intake/Output Summary (Last 24 hours) at 3/28/2024 0752  Last data filed at 3/28/2024 0655  Gross per 24 hour   Intake 791.53 ml   Output 450 ml   Net 341.53 ml       Physical Exam:  General:  WN, NAD, A and O x3.  CV:  Irregular Tachycardic. No murmur, rub, or gallop.  Resp:  CTA [place], equal, nonlabored.  Abd:  Soft, + BS, no organomegaly. Nontender to palpation.  Extrem:  No edema BLE, 2+ pedal/PT pulses.            Results from last 7 days   Lab Units 03/27/24  1113   WBC 10*3/mm3 11.58*   HEMOGLOBIN g/dL 16.2   HEMATOCRIT % 47.5   PLATELETS 10*3/mm3 318     Results from last 7 days   Lab Units 03/27/24  1113 03/26/24  2239   SODIUM mmol/L 136 133*   POTASSIUM mmol/L 4.6 4.7   CHLORIDE mmol/L 101 96*   CO2 mmol/L 26.0 26.0   BUN mg/dL 12 13   CREATININE mg/dL 1.26 1.26   CALCIUM mg/dL 10.0 10.2   BILIRUBIN mg/dL  --  1.1   ALK PHOS U/L  --  79   ALT (SGPT) U/L  --  55*   AST (SGOT) U/L  --  51*   GLUCOSE mg/dL 115* 108*         Results from last 7 days   Lab Units 03/27/24  0254   TSH uIU/mL 1.950     Results from last 7 days   Lab Units 03/27/24  1113   CHOLESTEROL mg/dL 265*   TRIGLYCERIDES mg/dL 298*   HDL CHOL mg/dL 29*   LDL CHOL mg/dL 178*     Results from last 7 days   Lab Units 03/27/24  0254   PROBNP pg/mL 589.5*       Tele: Atrial " fibrillation      ASSESSMENT:  -  44 yo male with PMHx PAF presents to [facility] ED with pre syncope and tachy palpitations after receiving 2 shots of Lidocaine for outpt procedure. Found to be in AFib with RVR upon presentation  -HTN  -HLP  -Obesity  -CRISTINO, new diagnosis.  Undergoing work up for CPAP.              PLAN:  -Admitted to hospitalist services.    -  Echo demonstrates EF 61 to 65% with mild concentric hypertrophy.  -Continue IV amiodarone 1mg/hr another 24 hours with bolus.  Continue beta blocker for now.  Consider Toprol XL at time of DC   -N.p.o. after midnight, obtain informed consent for CAROL and ECV at 0900 on Friday, 3/29/2024.  -Continue Xarelto 20mg daily with dinner.  CHADS2 Vasc = 1 (HTN).  -  Keep follow up with Pulm/Sleep Study to get evaluated for CPAP  -Treat anxiety  -  Cardiology will continue to follow.  Hopefully home tomorrow after ECV.       I discussed the patient's findings and my recommendations with the patient, any present family members, and the nursing staff.  Montrell Oneill MD saw and examined patient, verified hx and PE, read all radiographic studies, reviewed labs and micro data, and formulated dx, plan for treatment and all medical decision making.      Susan Meza PA-C  24, 07:52 EDT                      Electronically signed by Montrell Oneill MD at 24 0912       Kassidy Colunga MD at 24 0817              [facility] Medicine Services  PROGRESS NOTE    Patient Name: Neo Stern  : 1980  MRN: [id number]    Date of Admission: 3/26/2024  Primary Care Physician: Lea Archer APRN    Subjective   Subjective     CC:  acute palpitations    HPI:  no chest pain, ongoing palpitations.        Objective   Objective     Vital Signs:   Temp:  [97.8 °F (36.6 °C)-98.6 °F (37 °C)] 98.6 °F (37 °C)  Heart Rate:  [] 96  Resp:  [16-20] 16  BP: ()/() 119/72     Physical Exam:  Gen:  WD/WN man in bed, NAD, visitor  present   Neuro: alert and oriented, clear speech, follows commands, grossly nonfocal  HEENT:  NC/AT  Neck:  Supple, no LAD  Heart  irreg irreg no murmur.  Rate varying from 110 to 150 during my stay   Abd:  Soft, nontender, no rebound or guarding, pos BS  Extrem:  No c/c/e      Results Reviewed:  LAB RESULTS:      Lab 03/27/24 0254 03/26/24 2239   WBC  --  11.14*   HEMOGLOBIN  --  16.8   HEMATOCRIT  --  49.4   PLATELETS  --  335   NEUTROS ABS  --  8.18*   IMMATURE GRANS (ABS)  --  0.03   LYMPHS ABS  --  1.91   MONOS ABS  --  0.86   EOS ABS  --  0.08   MCV  --  89.0   PROCALCITONIN 0.04  --          Lab 03/27/24  0254 03/26/24 2239   SODIUM  --  133*   POTASSIUM  --  4.7   CHLORIDE  --  96*   CO2  --  26.0   ANION GAP  --  11.0   BUN  --  13   CREATININE  --  1.26   EGFR  --  72.1   GLUCOSE  --  108*   CALCIUM  --  10.2   MAGNESIUM  --  2.2   TSH 1.950  --          Lab 03/26/24 2239   TOTAL PROTEIN 8.0   ALBUMIN 4.7   GLOBULIN 3.3   ALT (SGPT) 55*   AST (SGOT) 51*   BILIRUBIN 1.1   ALK PHOS 79         Lab 03/27/24 0254   PROBNP 589.5*   HSTROP T 36*                 Brief Urine Lab Results  (Last result in the past 365 days)        Color   Clarity   Blood   Leuk Est   Nitrite   Protein   CREAT   Urine HCG        03/27/24 0601 Yellow   Clear   Negative   Negative   Negative   Negative                   Microbiology Results Abnormal       None            XR Chest 1 View    Result Date: 3/26/2024  XR CHEST 1 VW Date of Exam: 3/26/2024 10:41 PM EDT Indication: Dysrhythmia triage protocol Comparison: 2/25/2017 Findings: Lines: None Lungs: The lungs are clear. Pleura: No pleural effusion or pneumothorax. Cardiomediastinum: The cardiomediastinal silhouette is normal Soft Tissues: Unremarkable. Bones: No acute osseous abnormality.     Impression: Impression: No acute abnormality Electronically Signed: Brendon Ramon DO  3/26/2024 10:57 PM EDT  Workstation ID: [id number]         Current medications:  Scheduled  Meds:enoxaparin, 40 mg, Subcutaneous, Q12H  sodium chloride, 10 mL, Intravenous, Q12H      Continuous Infusions:dilTIAZem, 5-15 mg/hr, Last Rate: 15 mg/hr (03/27/24 0817)  sodium chloride, 75 mL/hr, Last Rate: 75 mL/hr (03/27/24 0701)      PRN Meds:.  acetaminophen **OR** acetaminophen **OR** acetaminophen    senna-docusate sodium **AND** polyethylene glycol **AND** bisacodyl **AND** bisacodyl    hydrOXYzine    melatonin    ondansetron ODT **OR** ondansetron    sodium chloride    sodium chloride    sodium chloride    Assessment & Plan   Assessment & Plan     Active Hospital Problems    Diagnosis  POA    **Atrial fibrillation with RVR [I48.91]  Yes    HTN (hypertension) [I10]  Yes    Leukocytosis [D72.829]  Yes    Situational anxiety [F41.8]  Yes    PAF (paroxysmal atrial fibrillation) [I48.0]  Yes      Resolved Hospital Problems   No resolved problems to display.        Brief Hospital Course to date:  Neo Stern is a 43 y.o. male w/ a hx of HTN, HLD, PAF who presented to the ED w/ c/o palpitations.  Has infrequent episodes through the years; does not follow closedly with Cardiol      PAFib  - not on rate/rhythm control or anticoag at home  - dilt gtt.  Add oral beta blocker  - echo and cards consult pending  - he is not NPO.  Discussed possibility of ECV however     HTN - hold ARB.      Expected Discharge Location and Transportation: home by car  Expected Discharge   Expected discharge date/ time has not been documented.     DVT prophylaxis:  Medical DVT prophylaxis orders are present.         AM-PAC 6 Clicks Score (PT): 24 (03/27/24 6908)    CODE STATUS:   Code Status and Medical Interventions:   Ordered at: 03/27/24 6150     Code Status (Patient has no pulse and is not breathing):    CPR (Attempt to Resuscitate)     Medical Interventions (Patient has pulse or is breathing):    Full Support       Kassidy Colunga MD  03/27/24        Electronically signed by Kassidy Colunga MD at 03/27/24 1020          Consult  Notes (all)        Montrell Oneill MD at 03/27/24 0806        Consult Orders    1. Inpatient Cardiology Consult [[id number]] ordered by Olimpia Acharya APRN at 03/27/24 0336                           Cardiology Consult - [facility]    Neo Stern     S213/1  1980  [address] [place] [id number]         Admission Date:  3/26/2024    Consultation Date:  03/27/24        PCP:  Lea Archer APRN  Referring MD:  Dr. Godfrey  Consulting MD:  Dr. Oneill  Primary Cardiologist:  Dr. Bob        CC: Lightheadedness, tachy palps    Reason for Consult: AFib with RVR        Allergies:  is allergic to doxycycline and erythromycin.    Medications Prior to Admission   Medication Sig Dispense Refill Last Dose    cetirizine (zyrTEC) 10 MG tablet Take 1 tablet by mouth Daily.   3/26/2024    losartan (COZAAR) 50 MG tablet Take 1.5 tablets by mouth Daily.          dilTIAZem, 5-15 mg/hr, Last Rate: 15 mg/hr (03/27/24 0154)  sodium chloride, 75 mL/hr, Last Rate: 75 mL/hr (03/27/24 0701)          HPI:  Neo Stern is a 42 yo male with PMHx HTN, PAF, seasonal allergies, anxiety.  Recently underwent sleep study and was diagnosed with sleep apnea - he has a follow up with them.  He was diagnosed with AFib in 2008 and at one time put on a beta blocker and coumadin.  He has since discontinued those meds.  Had another episode AFib in 2018, was placed on oral Cardizem short term and saw Dr. Bob in the office for further evaluation but did not keep follow up.    He presents to [facility] ED after having a pre syncopal episode and fluttering after receiving 2 shots of lidocaine for a skin tag removal.  Heart rates did not normalize after several hours prompting him to seek medical attention.  Upon arrival to ED, found to be in AFib with HR 160s. He was started on Cardizem gtt, admitted to hospitalist services and cardiology has been asked to follow in consultation.    At time of consultation, he remains  "in AFib rates 120-160s.  His wife is presents at bedside.  This is the first episode of AFib since 2018.  He does feel fluttering on occasion but these are very fleeting.  Again, he reiterates that he just underwent sleep study and has follow up to be fitted for CPAP.  He denies excessive caffeine intake, denies illicit drug use.  He is very active at work and works long hours.  He admits to being very stressed and anxious and when his heart races/skips it makes him more anxious.  He has no family history of precocious CAD or arrhythmia.          Social History     Socioeconomic History    Marital status:    Tobacco Use    Smoking status: Never    Smokeless tobacco: Never   Substance and Sexual Activity    Alcohol use: No    Drug use: No    Sexual activity: Defer     Family History   Problem Relation Age of Onset    No Known Problems Mother     Cancer Father     Hypertension Father      Past Surgical History:   Procedure Laterality Date    NO PAST SURGERIES       ROS: Pertinent items are noted in HPI, all other systems reviewed and negative     Objective     height is 185.4 cm (73\") and weight is 143 kg (315 lb) (abnormal). His oral temperature is 97.8 °F (36.6 °C). His blood pressure is 156/94 and his pulse is 99. His respiration is 20 and oxygen saturation is 96%.    Intake/Output Summary (Last 24 hours) at 3/27/2024 0806  Last data filed at 3/27/2024 0600  Gross per 24 hour   Intake 1000 ml   Output 525 ml   Net 475 ml     Intake/Output                   03/26/24 0701 - 03/27/24 0700     [id number] Total              Intake    IV Piggyback  --  1000 1000    Total Intake -- 1000 1000       Output    Urine  --  525 525    Total Output -- 525 525               03/26/24  2227   Weight: (!) 143 kg (315 lb)          Physical Exam:  General Appearance:    Alert, cooperative, in no acute distress   Head:    Normocephalic, without obvious abnormality, atraumatic   Eyes:            Lids and lashes normal, " conjunctivae and sclerae normal, no   icterus, no pallor, corneas clear, PERRLA   Ears:    Ears appear intact with no abnormalities noted   Throat:   No oral lesions, no thrush, oral mucosa moist   Neck:   No adenopathy, supple, trachea midline, no thyromegaly, no carotid bruit, no JVD   Back:     No kyphosis present, no scoliosis present, no skin lesions,    erythema or scars, no tenderness to percussion or                   palpation, range of motion normal   Lungs:     Clear to auscultation,respirations regular, even and               unlabored    Heart:    Irregular rhythm and tachycardic rate, normal S1 and S2, no         murmur, no gallop, no rub, no click   Abdomen:     Normal bowel sounds, no masses, no organomegaly, soft     nontender, nondistended, no guarding, no rebound   tenderness   Extremities:   Moves all extremities well,  no cyanosis, no redness, no edema   Pulses:   Pulses palpable and equal bilaterally   Skin:   No bleeding, bruising or rash   Lymph nodes:   No palpable adenopathy   Neurologic:   Cranial nerves 2 - 12 grossly intact, sensation intact, DTR     present and equal bilaterally          Results Review:  I personally reviewed the patient's clinical results.  Results from last 7 days   Lab Units 03/26/24  2239   WBC 10*3/mm3 11.14*   HEMOGLOBIN g/dL 16.8   HEMATOCRIT % 49.4   PLATELETS 10*3/mm3 335     Results from last 7 days   Lab Units 03/26/24  2239   SODIUM mmol/L 133*   POTASSIUM mmol/L 4.7   CHLORIDE mmol/L 96*   CO2 mmol/L 26.0   BUN mg/dL 13   CREATININE mg/dL 1.26   CALCIUM mg/dL 10.2   BILIRUBIN mg/dL 1.1   ALK PHOS U/L 79   ALT (SGPT) U/L 55*   AST (SGOT) U/L 51*   GLUCOSE mg/dL 108*           Results from last 7 days   Lab Units 03/27/24  0254   TSH uIU/mL 1.950         Results from last 7 days   Lab Units 03/27/24  0254   PROBNP pg/mL 589.5*             Radiology:  Imaging Results (Last 72 Hours)       Procedure Component Value Units Date/Time    XR Chest 1 View [[id number]]  Collected: 03/26/24 2256     Updated: 03/26/24 2300    Narrative:      XR CHEST 1 VW    Date of Exam: 3/26/2024 10:41 PM EDT    Indication: Dysrhythmia triage protocol    Comparison: 2/25/2017    Findings:  Lines: None    Lungs: The lungs are clear.  Pleura: No pleural effusion or pneumothorax.    Cardiomediastinum: The cardiomediastinal silhouette is normal    Soft Tissues: Unremarkable.    Bones: No acute osseous abnormality.      Impression:      Impression:  No acute abnormality      Electronically Signed: Brendon Yenny,     3/26/2024 10:57 PM EDT    Workstation ID: [id number]              Tele:  Atrial Fibrillation    ASSESSMENT:  -  44 yo male with PMHx PAF presents to [facility] ED with pre syncope and tachy palpitations after receiving 2 shots of Lidocaine for outpt procedure. Found to be in AFib with RVR upon presentation  -HTN  -HLP  -Obesity  -CRISTINO, new diagnosis.  Undergoing work up for CPAP.          PLAN:  -Admitted to hospitalist services.  Started on IV Cardizem per protocol.  -  Echo ordered, will review.  Allow patient to eat.  -  DC IV Cardizem, initiate amiodarone with bolus.  Continue beta blocker for now.  Consider Toprol XL at time of DC   -  DC Lovenox, start Xarelto 20mg daily with dinner.  CHADS2 Vasc = 1 (HTN).  -  Keep follow up with Pulm/Sleep Study to get evaluated for CPAP  -  Cardiology will continue to follow.  Hopefully home in a.m.            I discussed the patient's findings and my recommendations with the patient, any present family members, and the nursing staff.  Montrell Oneill MD saw and examined patient, verified hx and PE, read all radiographic studies, reviewed labs and micro data, and formulated dx, plan for treatment and all medical decision making.      Susan Meza PA-C, working with Montrell Oneill MD  03/27/24 08:06 EDT        Electronically signed by Montrell Oneill MD at 03/27/24 0097

## 2024-03-28 NOTE — PROGRESS NOTES
[facility] Medicine Services  PROGRESS NOTE    Patient Name: Neo Stern  : 1980  MRN: [id number]    Date of Admission: 3/26/2024  Primary Care Physician: Lea Archer APRN    Subjective   Subjective     CC:  acute palpitations    HPI:  no chest pain, ongoing palpitations.  Anxious.        Objective   Objective     Vital Signs:   Temp:  [97.8 °F (36.6 °C)-98.4 °F (36.9 °C)] 97.9 °F (36.6 °C)  Heart Rate:  [] 131  Resp:  [18-] 23  BP: ()/() 120/77     Physical Exam:  Gen:  WD/WN man in bed, NAD, visitor present   Neuro: alert and oriented, clear speech, follows commands, grossly nonfocal  HEENT:  NC/AT  Neck:  Supple, no LAD  Heart  irreg irreg no murmur. 120s.    Abd:  Soft, nontender, no rebound or guarding, pos BS  Extrem:  No c/c/e      Results Reviewed:  LAB RESULTS:      Lab 24  1404 24  1113 24  0254 24  2239   WBC  --  11.58*  --  11.14*   HEMOGLOBIN  --  16.2  --  16.8   HEMATOCRIT  --  47.5  --  49.4   PLATELETS  --  318  --  335   NEUTROS ABS  --  8.81*  --  8.18*   IMMATURE GRANS (ABS)  --  0.03  --  0.03   LYMPHS ABS  --  1.78  --  1.91   MONOS ABS  --  0.80  --  0.86   EOS ABS  --  0.10  --  0.08   MCV  --  90.6  --  89.0   PROCALCITONIN  --   --  0.04  --    LACTATE 1.7 2.1*  --   --          Lab 24  1113 24  0254 24  2239   SODIUM 136  --  133*   POTASSIUM 4.6  --  4.7   CHLORIDE 101  --  96*   CO2 26.0  --  26.0   ANION GAP 9.0  --  11.0   BUN 12  --  13   CREATININE 1.26  --  1.26   EGFR 72.1  --  72.1   GLUCOSE 115*  --  108*   CALCIUM 10.0  --  10.2   MAGNESIUM  --   --  2.2   HEMOGLOBIN A1C 5.20  --   --    TSH  --  1.950  --          Lab 24  2239   TOTAL PROTEIN 8.0   ALBUMIN 4.7   GLOBULIN 3.3   ALT (SGPT) 55*   AST (SGOT) 51*   BILIRUBIN 1.1   ALK PHOS 79         Lab 24  0254   PROBNP 589.5*   HSTROP T 36*         Lab 24  1113   CHOLESTEROL 265*   LDL CHOL 178*   HDL  CHOL 29*   TRIGLYCERIDES 298*             Brief Urine Lab Results  (Last result in the past 365 days)        Color   Clarity   Blood   Leuk Est   Nitrite   Protein   CREAT   Urine HCG        03/27/24 0601 Yellow   Clear   Negative   Negative   Negative   Negative                   Microbiology Results Abnormal       None            Adult Transthoracic Echo Complete W/ Cont if Necessary Per Protocol    Result Date: 3/27/2024    Left ventricular ejection fraction appears to be 61 - 65%.   Left ventricular wall thickness is consistent with mild concentric hypertrophy.     XR Chest 1 View    Result Date: 3/26/2024  XR CHEST 1 VW Date of Exam: 3/26/2024 10:41 PM EDT Indication: Dysrhythmia triage protocol Comparison: 2/25/2017 Findings: Lines: None Lungs: The lungs are clear. Pleura: No pleural effusion or pneumothorax. Cardiomediastinum: The cardiomediastinal silhouette is normal Soft Tissues: Unremarkable. Bones: No acute osseous abnormality.     Impression: Impression: No acute abnormality Electronically Signed: Brendon Ramon,   3/26/2024 10:57 PM EDT  Workstation ID: [id number]     Results for orders placed during the hospital encounter of 03/26/24    Adult Transthoracic Echo Complete W/ Cont if Necessary Per Protocol    Interpretation Summary    Left ventricular ejection fraction appears to be 61 - 65%.    Left ventricular wall thickness is consistent with mild concentric hypertrophy.      Current medications:  Scheduled Meds:amiodarone, 150 mg, Intravenous, Once  metoprolol tartrate, 25 mg, Oral, Q12H  rivaroxaban, 20 mg, Oral, Daily With Dinner  sodium chloride, 10 mL, Intravenous, Q12H      Continuous Infusions:amiodarone, 1 mg/min      PRN Meds:.  acetaminophen **OR** acetaminophen **OR** acetaminophen    senna-docusate sodium **AND** polyethylene glycol **AND** bisacodyl **AND** bisacodyl    hydrOXYzine    LORazepam    melatonin    ondansetron ODT **OR** ondansetron    sodium chloride    sodium chloride     zolpidem    Assessment & Plan   Assessment & Plan     Active Hospital Problems    Diagnosis  POA    **Atrial fibrillation with RVR [I48.91]  Yes    HTN (hypertension) [I10]  Yes    Leukocytosis [D72.829]  Yes    Situational anxiety [F41.8]  Yes    PAF (paroxysmal atrial fibrillation) [I48.0]  Yes      Resolved Hospital Problems   No resolved problems to display.        Brief Hospital Course to date:  Neo Stern is a 43 y.o. male w/ a hx of HTN, HLD, PAF who presented to the ED w/ c/o palpitations.  Has infrequent episodes through the years; does not follow closedly with Cardiol      PAFib  - not on rate/rhythm control or anticoag at home  - amio gtt.  Still has not converted.  Plan for CAROL ECV tomorrow     Anxiety  - atarax, benzos for now.   - due to amio, avoid qt prolonging agents     HTN - hold ARB.      Expected Discharge Location and Transportation: home by car  Expected Discharge   Expected Discharge Date: 3/27/2024; Expected Discharge Time:      DVT prophylaxis:  Medical DVT prophylaxis orders are present.         AM-PAC 6 Clicks Score (PT): 24 (03/28/24 0300)    CODE STATUS:   Code Status and Medical Interventions:   Ordered at: 03/27/24 0336     Code Status (Patient has no pulse and is not breathing):    CPR (Attempt to Resuscitate)     Medical Interventions (Patient has pulse or is breathing):    Full Support       Kassidy Colunga MD  03/28/24

## 2024-03-28 NOTE — PROGRESS NOTES
" [facility] - Progress Note    Neo Stern  1980  S213/1    03/28/24, 07:52 EDT      Chief Complaint: Following for PAF    Subjective:   Remains in A-fib, rates somewhat better controlled.  Very anxious.  Slept somewhat better last night.  Denies dyspnea, denies chest pain    Review of Systems:  Pertinent positives are listed above and in physical exam.  All others have been reviewed and are negative.    metoprolol tartrate, 25 mg, Oral, Q12H  rivaroxaban, 20 mg, Oral, Daily With Dinner  sodium chloride, 10 mL, Intravenous, Q12H        Objective:  Vitals:   height is 185.4 cm (73\") and weight is 143 kg (315 lb) (abnormal). His oral temperature is 98.3 °F (36.8 °C). His blood pressure is 120/77 and his pulse is 109. His respiration is 23 and oxygen saturation is 94%.     Intake/Output Summary (Last 24 hours) at 3/28/2024 0752  Last data filed at 3/28/2024 0655  Gross per 24 hour   Intake 791.53 ml   Output 450 ml   Net 341.53 ml       Physical Exam:  General:  WN, NAD, A and O x3.  CV:  Irregular Tachycardic. No murmur, rub, or gallop.  Resp:  CTA [place], equal, nonlabored.  Abd:  Soft, + BS, no organomegaly. Nontender to palpation.  Extrem:  No edema BLE, 2+ pedal/PT pulses.            Results from last 7 days   Lab Units 03/27/24  1113   WBC 10*3/mm3 11.58*   HEMOGLOBIN g/dL 16.2   HEMATOCRIT % 47.5   PLATELETS 10*3/mm3 318     Results from last 7 days   Lab Units 03/27/24  1113 03/26/24  2239   SODIUM mmol/L 136 133*   POTASSIUM mmol/L 4.6 4.7   CHLORIDE mmol/L 101 96*   CO2 mmol/L 26.0 26.0   BUN mg/dL 12 13   CREATININE mg/dL 1.26 1.26   CALCIUM mg/dL 10.0 10.2   BILIRUBIN mg/dL  --  1.1   ALK PHOS U/L  --  79   ALT (SGPT) U/L  --  55*   AST (SGOT) U/L  --  51*   GLUCOSE mg/dL 115* 108*         Results from last 7 days   Lab Units 03/27/24  0254   TSH uIU/mL 1.950     Results from last 7 days   Lab Units 03/27/24  1113   CHOLESTEROL mg/dL 265*   TRIGLYCERIDES mg/dL 298*   HDL CHOL mg/dL " 29*   LDL CHOL mg/dL 178*     Results from last 7 days   Lab Units 03/27/24  0254   PROBNP pg/mL 589.5*       Tele: Atrial fibrillation      ASSESSMENT:  -  42 yo male with PMHx PAF presents to [facility] ED with pre syncope and tachy palpitations after receiving 2 shots of Lidocaine for outpt procedure. Found to be in AFib with RVR upon presentation  -HTN  -HLP  -Obesity  -CRISTINO, new diagnosis.  Undergoing work up for CPAP.              PLAN:  -Admitted to hospitalist services.    -  Echo demonstrates EF 61 to 65% with mild concentric hypertrophy.  -Continue IV amiodarone 1mg/hr another 24 hours with bolus.  Continue beta blocker for now.  Consider Toprol XL at time of DC   -N.p.o. after midnight, obtain informed consent for CAROL and ECV at 0900 on Friday, 3/29/2024.  -Continue Xarelto 20mg daily with dinner.  CHADS2 Vasc = 1 (HTN).  -  Keep follow up with Pulm/Sleep Study to get evaluated for CPAP  -Treat anxiety  -  Cardiology will continue to follow.  Hopefully home tomorrow after ECV.       I discussed the patient's findings and my recommendations with the patient, any present family members, and the nursing staff.  Montrell Oneill MD saw and examined patient, verified hx and PE, read all radiographic studies, reviewed labs and micro data, and formulated dx, plan for treatment and all medical decision making.      Susan Meza PA-C  03/28/24, 07:52 EDT

## 2024-03-29 ENCOUNTER — APPOINTMENT (OUTPATIENT)
Dept: CARDIOLOGY | Facility: HOSPITAL | Age: 44
DRG: 309 | End: 2024-03-29
Payer: COMMERCIAL

## 2024-03-29 VITALS
RESPIRATION RATE: 16 BRPM | DIASTOLIC BLOOD PRESSURE: 79 MMHG | WEIGHT: 315 LBS | OXYGEN SATURATION: 97 % | SYSTOLIC BLOOD PRESSURE: 142 MMHG | HEIGHT: 73 IN | TEMPERATURE: 98.4 F | HEART RATE: 81 BPM | BODY MASS INDEX: 41.75 KG/M2

## 2024-03-29 LAB
QT INTERVAL: 452 MS
QTC INTERVAL: 484 MS

## 2024-03-29 PROCEDURE — 25010000002 AMIODARONE IN DEXTROSE 5% 360-4.14 MG/200ML-% SOLUTION: Performed by: PHYSICIAN ASSISTANT

## 2024-03-29 PROCEDURE — 93005 ELECTROCARDIOGRAM TRACING: CPT | Performed by: PHYSICIAN ASSISTANT

## 2024-03-29 RX ORDER — ROSUVASTATIN CALCIUM 20 MG/1
20 TABLET, COATED ORAL NIGHTLY
Qty: 90 TABLET | Refills: 3 | Status: SHIPPED | OUTPATIENT
Start: 2024-03-29

## 2024-03-29 RX ORDER — METOPROLOL TARTRATE 50 MG/1
50 TABLET, FILM COATED ORAL EVERY 12 HOURS SCHEDULED
Qty: 60 TABLET | Refills: 11 | Status: SHIPPED | OUTPATIENT
Start: 2024-03-29

## 2024-03-29 RX ADMIN — METOPROLOL TARTRATE 25 MG: 25 TABLET, FILM COATED ORAL at 09:02

## 2024-03-29 RX ADMIN — METOPROLOL TARTRATE 25 MG: 25 TABLET, FILM COATED ORAL at 05:22

## 2024-03-29 RX ADMIN — Medication 5 MG: at 00:13

## 2024-03-29 RX ADMIN — AMIODARONE HYDROCHLORIDE 1 MG/MIN: 1.8 INJECTION, SOLUTION INTRAVENOUS at 03:37

## 2024-03-29 NOTE — PROGRESS NOTES
" [facility] - Progress Note    Neo Stern  1980  S213/1    03/29/24, 07:40 EDT      Chief Complaint: Following for PAF    Subjective:   Converted to NSR yesterday afternoon and has maintained.  Feeling better.  Slept well overnight.        Review of Systems:  Pertinent positives are listed above and in physical exam.  All others have been reviewed and are negative.    metoprolol tartrate, 25 mg, Oral, Q8H  rivaroxaban, 20 mg, Oral, Daily With Dinner  rosuvastatin, 20 mg, Oral, Nightly  sodium chloride, 10 mL, Intravenous, Q12H        Objective:  Vitals:   height is 185.4 cm (73\") and weight is 143 kg (315 lb) (abnormal). His oral temperature is 98.8 °F (37.1 °C). His blood pressure is 144/96 and his pulse is 73. His respiration is 18 and oxygen saturation is 96%.   No intake or output data in the 24 hours ending 03/29/24 0740      Physical Exam:  General:  WN, NAD, A and O x3.  CV:  Normal S1, S2.  No murmur, rub, or gallop.  Resp:  CTA [place], equal, nonlabored.  Abd:  Soft, + BS, no organomegaly. Nontender to palpation.  Extrem:  No edema BLE, 2+ pedal/PT pulses.            Results from last 7 days   Lab Units 03/27/24  1113   WBC 10*3/mm3 11.58*   HEMOGLOBIN g/dL 16.2   HEMATOCRIT % 47.5   PLATELETS 10*3/mm3 318     Results from last 7 days   Lab Units 03/27/24  1113 03/26/24  2239   SODIUM mmol/L 136 133*   POTASSIUM mmol/L 4.6 4.7   CHLORIDE mmol/L 101 96*   CO2 mmol/L 26.0 26.0   BUN mg/dL 12 13   CREATININE mg/dL 1.26 1.26   CALCIUM mg/dL 10.0 10.2   BILIRUBIN mg/dL  --  1.1   ALK PHOS U/L  --  79   ALT (SGPT) U/L  --  55*   AST (SGOT) U/L  --  51*   GLUCOSE mg/dL 115* 108*         Results from last 7 days   Lab Units 03/27/24  0254   TSH uIU/mL 1.950     Results from last 7 days   Lab Units 03/27/24  1113   CHOLESTEROL mg/dL 265*   TRIGLYCERIDES mg/dL 298*   HDL CHOL mg/dL 29*   LDL CHOL mg/dL 178*     Results from last 7 days   Lab Units 03/27/24  0254   PROBNP pg/mL 589.5* "       Tele: NSR      ASSESSMENT:  -  42 yo male with PMHx PAF presents to [facility] ED with pre syncope and tachy palpitations after receiving 2 shots of Lidocaine for outpt procedure. Found to be in AFib with RVR upon presentation  -HTN  -HLP  -Obesity  -CRISTINO, new diagnosis.  Undergoing work up for CPAP.              PLAN:  -Admitted to hospitalist services.    -  Echo demonstrates EF 61 to 65% with mild concentric hypertrophy.  -converted to NSR 3/28 after re bolus of IV Amio, increased beta blocker and continued IV Amio.  -DC Amio.  Change Metoprolol to 50mg BID.   -Continue Xarelto 20mg daily with dinner.  CHADS2 Vasc = 1 (HTN).  -  Keep follow up with Pulm/Sleep Study to get evaluated for CPAP  -Treat anxiety, per primary service/PCP  -Okay for DC home from cardiology standpoint.  Will have patient follow-up with us in 1 month with EKG. DC on the following cardiac meds: Xarelto 20 mg daily with dinner, Crestor 20 mg nightly, metoprolol tartrate 50 mg twice daily.        I discussed the patient's findings and my recommendations with the patient, any present family members, and the nursing staff.  Montrell Oneill MD saw and examined patient, verified hx and PE, read all radiographic studies, reviewed labs and micro data, and formulated dx, plan for treatment and all medical decision making.      Susan Meza PA-C  03/29/24, 07:49 EDT

## 2024-03-29 NOTE — PAYOR COMM NOTE
"Emma Mcnair RN  Utilization Management  P:[phone]  F:[phone]    Auth# [id number]     Boris Hayes (43 y.o. Male)       Date of Birth   1980    Social Security Number       Address   [address] [place] [address]    Home Phone   [phone]    MRN   [id number]       Evangelical   Anabaptist    Marital Status                               Admission Date   3/26/24    Admission Type   Emergency    Admitting Provider   Kassidy Colunga MD    Attending Provider       Department, Room/Bed   [address], S213/1       Discharge Date   3/29/2024    Discharge Disposition   Home or Self Care    Discharge Destination                                 Attending Provider: (none)   Allergies: Doxycycline, Erythromycin    Isolation: None   Infection: None   Code Status: CPR    Ht: 185.4 cm (73\")   Wt: 143 kg (315 lb)    Admission Cmt: None   Principal Problem: Atrial fibrillation with RVR [I48.91]                   Active Insurance as of 3/26/2024       Primary Coverage       Payor Plan Insurance Group Employer/Plan Group    [id number]       Payor Plan Address Payor Plan Phone Number Payor Plan Fax Number Effective Dates    [address] [phone]  8/1/2022 - None Entered    [facility] [id number]         Subscriber Name Subscriber Birth Date Member ID       BORIS HAYES 1980 [id number]                     Emergency Contacts        (Rel.) Home Phone Work Phone Mobile Phone    Jennifer Hayes (Spouse) [phone] -- --                 Discharge Summary        Montrell Oneill MD at 03/29/24 0914          Discharge summary      oBris Hayes  1980  S213/1      Admit date: 3/26/2024  DC date: 3/29/2024    Admit history: 43-year-old  CM presents to [facility] ED with complaint of chest discomfort and tachypalpitations after receiving 2 shots of lidocaine for skin tag removal as an outpatient.  Upon arrival, found to be in atrial " fibrillation with RVR.  He does have a history of PAF with last episode being in 2018.    Hospital course: Mr. Stern is a 43-year-old CM admitted with A-fib RVR.  He has a history of PAF initially diagnosed 2008 and again 2018.  He was previously on short-term Coumadin and medical therapy both have since been discontinued.  He states this episode in the 1 in 2018 both occurred after receiving numbing shots for procedures.  He denies chest pain or exertional angina otherwise, denies exertional dyspnea, orthopnea or PND.  He was recently diagnosed with hypertension and prescribed ARB therapy.  He also reports that he underwent outpatient sleep study evaluation and was diagnosed with sleep apnea.  He has upcoming follow-up.    He was admitted initially and started on IV Cardizem.  Rates did not improved and this was discontinued and he was switched to IV amiodarone.  He was also placed on oral metoprolol to tartrate.  Echocardiogram performed demonstrates normal LV function, no wall motion abnormalities.  He was also treated by primary service for anxiety which she reports has been a challenge for him as an outpatient.  After 24 hours of IV amiodarone, patient remained in A-fib with somewhat better rate control.  He was rebolused and his beta-blocker was increased.  Shortly thereafter, he did convert to sinus rhythm.  He maintained NSR over the last 24 hours.  At time of evaluation today, Friday, 3/29/2024, patient remained in NSR with good BP control.  It was felt the patient could be discharged home and followed up closely as an outpatient.    Patient will be discharged home in stable condition.  He will follow-up with cardiology in 4 to 6 weeks with EKG.  He is to keep his follow-up with pulmonology/sleep study to get fitted for CPAP.  He can follow-up with PCP regarding his anxiety.    Discharge medications are as follows: Xarelto 20 mg daily with dinner, metoprolol tartrate 50 mg twice daily, Crestor 20 mg  nightly, Zyrtec 10 mg daily.    This ends at the DC summary on Mr. Neo Stern, 1980.  Please see epic chart for further specific details.    Electronically signed by RANDAL Jennings, 03/29/24, 9:20 AM EDT.      Electronically signed by Montrell Oneill MD at 03/29/24 0937

## 2024-03-29 NOTE — PLAN OF CARE
Goal Outcome Evaluation:  Plan of Care Reviewed With: patient        Progress: improving  Outcome Evaluation: patient alert and oriented NSR on room air VSS walking with stand by shaquille bourne DC                               Problem: Fall Injury Risk  Goal: Absence of Fall and Fall-Related Injury  3/29/2024 1010 by Abby Saucedo RN  Outcome: Ongoing, Progressing  3/29/2024 0936 by Abby Saucedo RN  Outcome: Ongoing, Progressing  Intervention: Promote Injury-Free Environment  Recent Flowsheet Documentation  Taken 3/29/2024 0800 by Abby Saucedo RN  Safety Promotion/Fall Prevention:   activity supervised   safety round/check completed     Problem: Adult Inpatient Plan of Care  Goal: Plan of Care Review  3/29/2024 1010 by Abby Saucedo RN  Outcome: Ongoing, Progressing  Flowsheets (Taken 3/29/2024 1010)  Progress: improving  Plan of Care Reviewed With: patient  Outcome Evaluation: patient alert and oriented NSR on room air VSS walking with stand by shaquille bourne DC  3/29/2024 0936 by Abby Saucedo RN  Outcome: Ongoing, Progressing  Flowsheets (Taken 3/28/2024 1800)  Progress: improving  Plan of Care Reviewed With: patient  Outcome Evaluation: patient alert and oriented, converted to NSR from Afib/RVR at 1600 Cardiology notifed patient denies pain at this time tolerating diet well NPO after midnight famiy at bedside shaquille for DC  Goal: Patient-Specific Goal (Individualized)  3/29/2024 1010 by Abby Saucedo RN  Outcome: Ongoing, Progressing  3/29/2024 0936 by Abby Saucedo RN  Outcome: Ongoing, Progressing  Goal: Absence of Hospital-Acquired Illness or Injury  3/29/2024 1010 by Abby Saucedo RN  Outcome: Ongoing, Progressing  3/29/2024 0936 by Abby Saucedo RN  Outcome: Ongoing, Progressing  Intervention: Identify and Manage Fall Risk  Recent Flowsheet Documentation  Taken 3/29/2024 0800 by Abby Saucedo RN  Safety Promotion/Fall Prevention:   activity supervised   safety  round/check completed  Intervention: Prevent Skin Injury  Recent Flowsheet Documentation  Taken 3/29/2024 0800 by Abby Saucedo RN  Body Position: supine, legs elevated  Skin Protection: adhesive use limited  Intervention: Prevent and Manage VTE (Venous Thromboembolism) Risk  Recent Flowsheet Documentation  Taken 3/29/2024 0800 by Abby Saucedo RN  Activity Management: activity encouraged  Intervention: Prevent Infection  Recent Flowsheet Documentation  Taken 3/29/2024 0800 by Abby Saucedo RN  Infection Prevention: rest/sleep promoted  Goal: Optimal Comfort and Wellbeing  3/29/2024 1010 by Abby Saucedo RN  Outcome: Ongoing, Progressing  3/29/2024 0936 by Abby Saucedo RN  Outcome: Ongoing, Progressing  Intervention: Monitor Pain and Promote Comfort  Recent Flowsheet Documentation  Taken 3/29/2024 0800 by Abby Saucedo RN  Pain Management Interventions:   quiet environment facilitated   relaxation techniques promoted  Intervention: Provide Person-Centered Care  Recent Flowsheet Documentation  Taken 3/29/2024 0800 by Abby Saucedo RN  Trust Relationship/Rapport:   care explained   choices provided   emotional support provided   empathic listening provided   questions answered   questions encouraged   reassurance provided   thoughts/feelings acknowledged  Goal: Readiness for Transition of Care  3/29/2024 1010 by Abby Saucedo RN  Outcome: Ongoing, Progressing  3/29/2024 0936 by Abby Saucedo RN  Outcome: Ongoing, Progressing     Problem: Hypertension Comorbidity  Goal: Blood Pressure in Desired Range  3/29/2024 1010 by Abby Saucedo RN  Outcome: Ongoing, Progressing  3/29/2024 0936 by Abby Saucedo RN  Outcome: Ongoing, Progressing  Intervention: Maintain Blood Pressure Management  Recent Flowsheet Documentation  Taken 3/29/2024 0800 by Abby Saucedo RN  Syncope Management: position changed slowly

## 2024-03-29 NOTE — PLAN OF CARE
Goal Outcome Evaluation:         Pt has appeared to have rested well this shift. No need for anxiety medication. Pt has been NSR on monitor for this shift. RA. NPO after midnight. No complaints or concerns at this time

## 2024-03-29 NOTE — DISCHARGE SUMMARY
Discharge summary      Neo Stern  1980  S213/1      Admit date: 3/26/2024  DC date: 3/29/2024    Admit history: 43-year-old  CM presents to [facility] ED with complaint of chest discomfort and tachypalpitations after receiving 2 shots of lidocaine for skin tag removal as an outpatient.  Upon arrival, found to be in atrial fibrillation with RVR.  He does have a history of PAF with last episode being in 2018.    Hospital course: Mr. Stern is a 43-year-old CM admitted with A-fib RVR.  He has a history of PAF initially diagnosed 2008 and again 2018.  He was previously on short-term Coumadin and medical therapy both have since been discontinued.  He states this episode in the 1 in 2018 both occurred after receiving numbing shots for procedures.  He denies chest pain or exertional angina otherwise, denies exertional dyspnea, orthopnea or PND.  He was recently diagnosed with hypertension and prescribed ARB therapy.  He also reports that he underwent outpatient sleep study evaluation and was diagnosed with sleep apnea.  He has upcoming follow-up.    He was admitted initially and started on IV Cardizem.  Rates did not improved and this was discontinued and he was switched to IV amiodarone.  He was also placed on oral metoprolol to tartrate.  Echocardiogram performed demonstrates normal LV function, no wall motion abnormalities.  He was also treated by primary service for anxiety which she reports has been a challenge for him as an outpatient.  After 24 hours of IV amiodarone, patient remained in A-fib with somewhat better rate control.  He was rebolused and his beta-blocker was increased.  Shortly thereafter, he did convert to sinus rhythm.  He maintained NSR over the last 24 hours.  At time of evaluation today, Friday, 3/29/2024, patient remained in NSR with good BP control.  It was felt the patient could be discharged home and followed up closely as an outpatient.    Patient will be discharged home in  stable condition.  He will follow-up with cardiology in 4 to 6 weeks with EKG.  He is to keep his follow-up with pulmonology/sleep study to get fitted for CPAP.  He can follow-up with PCP regarding his anxiety.    Discharge medications are as follows: Xarelto 20 mg daily with dinner, metoprolol tartrate 50 mg twice daily, Crestor 20 mg nightly, Zyrtec 10 mg daily.    This ends at the DC summary on Mr. Neo Stern, 1980.  Please see epic chart for further specific details.    Electronically signed by RANDAL Jennings, 03/29/24, 9:20 AM EDT.

## 2024-03-29 NOTE — PLAN OF CARE
Goal Outcome Evaluation:  Plan of Care Reviewed With: patient        Progress: improving  Outcome Evaluation: patient alert and oriented, converted to NSR from Afib/RVR at 1600 Cardiology notifed patient denies pain at this time tolerating diet well NPO after midnight famiy at bedside eager for DC

## 2024-03-30 LAB
QT INTERVAL: 404 MS
QTC INTERVAL: 480 MS

## 2024-04-03 LAB
QT INTERVAL: 452 MS
QTC INTERVAL: 484 MS